# Patient Record
Sex: MALE | Race: WHITE
[De-identification: names, ages, dates, MRNs, and addresses within clinical notes are randomized per-mention and may not be internally consistent; named-entity substitution may affect disease eponyms.]

---

## 2017-11-22 ENCOUNTER — HOSPITAL ENCOUNTER (INPATIENT)
Dept: HOSPITAL 17 - N03A | Age: 77
LOS: 5 days | Discharge: HOME | DRG: 854 | End: 2017-11-27
Attending: HOSPITALIST | Admitting: HOSPITALIST
Payer: MEDICARE

## 2017-11-22 VITALS
HEART RATE: 61 BPM | OXYGEN SATURATION: 95 % | RESPIRATION RATE: 22 BRPM | DIASTOLIC BLOOD PRESSURE: 72 MMHG | SYSTOLIC BLOOD PRESSURE: 161 MMHG

## 2017-11-22 VITALS — BODY MASS INDEX: 26.55 KG/M2 | WEIGHT: 179.24 LBS | HEIGHT: 69 IN

## 2017-11-22 VITALS
TEMPERATURE: 98.2 F | DIASTOLIC BLOOD PRESSURE: 76 MMHG | OXYGEN SATURATION: 94 % | HEART RATE: 62 BPM | RESPIRATION RATE: 22 BRPM | SYSTOLIC BLOOD PRESSURE: 173 MMHG

## 2017-11-22 VITALS
TEMPERATURE: 98 F | DIASTOLIC BLOOD PRESSURE: 74 MMHG | HEART RATE: 62 BPM | SYSTOLIC BLOOD PRESSURE: 188 MMHG | OXYGEN SATURATION: 94 % | RESPIRATION RATE: 25 BRPM

## 2017-11-22 VITALS — HEART RATE: 61 BPM

## 2017-11-22 VITALS — HEART RATE: 60 BPM

## 2017-11-22 VITALS — HEART RATE: 66 BPM

## 2017-11-22 DIAGNOSIS — Z92.3: ICD-10-CM

## 2017-11-22 DIAGNOSIS — E83.51: ICD-10-CM

## 2017-11-22 DIAGNOSIS — E78.5: ICD-10-CM

## 2017-11-22 DIAGNOSIS — J44.9: ICD-10-CM

## 2017-11-22 DIAGNOSIS — Z92.21: ICD-10-CM

## 2017-11-22 DIAGNOSIS — C61: ICD-10-CM

## 2017-11-22 DIAGNOSIS — E11.9: ICD-10-CM

## 2017-11-22 DIAGNOSIS — F17.210: ICD-10-CM

## 2017-11-22 DIAGNOSIS — E83.39: ICD-10-CM

## 2017-11-22 DIAGNOSIS — E87.6: ICD-10-CM

## 2017-11-22 DIAGNOSIS — E83.42: ICD-10-CM

## 2017-11-22 DIAGNOSIS — R63.0: ICD-10-CM

## 2017-11-22 DIAGNOSIS — Z79.01: ICD-10-CM

## 2017-11-22 DIAGNOSIS — I10: ICD-10-CM

## 2017-11-22 DIAGNOSIS — K55.1: ICD-10-CM

## 2017-11-22 DIAGNOSIS — A41.9: Primary | ICD-10-CM

## 2017-11-22 DIAGNOSIS — K81.0: ICD-10-CM

## 2017-11-22 DIAGNOSIS — Z23: ICD-10-CM

## 2017-11-22 DIAGNOSIS — K59.00: ICD-10-CM

## 2017-11-22 DIAGNOSIS — C79.9: ICD-10-CM

## 2017-11-22 LAB
ALP SERPL-CCNC: 78 U/L (ref 45–117)
ALT SERPL-CCNC: 20 U/L (ref 12–78)
AMYLASE SERPL-CCNC: 16 U/L (ref 25–115)
ANION GAP SERPL CALC-SCNC: 7 MEQ/L (ref 5–15)
APTT BLD: 29 SEC (ref 24.3–30.1)
AST SERPL-CCNC: 18 U/L (ref 15–37)
BASOPHILS # BLD AUTO: 0.1 TH/MM3 (ref 0–0.2)
BASOPHILS NFR BLD: 0.4 % (ref 0–2)
BILIRUB INDIRECT SERPL-MCNC: 0.1 MG/DL (ref 0–0.8)
BILIRUB SERPL-MCNC: 0.3 MG/DL (ref 0.2–1)
BUN SERPL-MCNC: 18 MG/DL (ref 7–18)
CHLORIDE SERPL-SCNC: 102 MEQ/L (ref 98–107)
EOSINOPHIL # BLD: 0 TH/MM3 (ref 0–0.4)
EOSINOPHIL NFR BLD: 0 % (ref 0–4)
ERYTHROCYTE [DISTWIDTH] IN BLOOD BY AUTOMATED COUNT: 13.9 % (ref 11.6–17.2)
GFR SERPLBLD BASED ON 1.73 SQ M-ARVRAT: 55 ML/MIN (ref 89–?)
HCO3 BLD-SCNC: 26.2 MEQ/L (ref 21–32)
HCT VFR BLD CALC: 32.8 % (ref 39–51)
HEMO FLAGS: (no result)
INR PPP: 1 RATIO
LYMPHOCYTES # BLD AUTO: 2.1 TH/MM3 (ref 1–4.8)
LYMPHOCYTES NFR BLD AUTO: 7.7 % (ref 9–44)
MAGNESIUM SERPL-MCNC: 1.9 MG/DL (ref 1.5–2.5)
MCH RBC QN AUTO: 29.6 PG (ref 27–34)
MCHC RBC AUTO-ENTMCNC: 32.9 % (ref 32–36)
MCV RBC AUTO: 90 FL (ref 80–100)
MONOCYTES NFR BLD: 7.3 % (ref 0–8)
NEUTROPHILS # BLD AUTO: 23 TH/MM3 (ref 1.8–7.7)
NEUTROPHILS NFR BLD AUTO: 84.6 % (ref 16–70)
PLATELET # BLD: 297 TH/MM3 (ref 150–450)
POTASSIUM SERPL-SCNC: 2.7 MEQ/L (ref 3.5–5.1)
PROTHROMBIN TIME: 10.8 SEC (ref 9.8–11.6)
RBC # BLD AUTO: 3.65 MIL/MM3 (ref 4.5–5.9)
SODIUM SERPL-SCNC: 135 MEQ/L (ref 136–145)
WBC # BLD AUTO: 27.2 TH/MM3 (ref 4–11)

## 2017-11-22 PROCEDURE — 80076 HEPATIC FUNCTION PANEL: CPT

## 2017-11-22 PROCEDURE — 88304 TISSUE EXAM BY PATHOLOGIST: CPT

## 2017-11-22 PROCEDURE — 84155 ASSAY OF PROTEIN SERUM: CPT

## 2017-11-22 PROCEDURE — 82533 TOTAL CORTISOL: CPT

## 2017-11-22 PROCEDURE — 83735 ASSAY OF MAGNESIUM: CPT

## 2017-11-22 PROCEDURE — 80048 BASIC METABOLIC PNL TOTAL CA: CPT

## 2017-11-22 PROCEDURE — 71010: CPT

## 2017-11-22 PROCEDURE — 85027 COMPLETE CBC AUTOMATED: CPT

## 2017-11-22 PROCEDURE — 86901 BLOOD TYPING SEROLOGIC RH(D): CPT

## 2017-11-22 PROCEDURE — 82948 REAGENT STRIP/BLOOD GLUCOSE: CPT

## 2017-11-22 PROCEDURE — 87641 MR-STAPH DNA AMP PROBE: CPT

## 2017-11-22 PROCEDURE — 84100 ASSAY OF PHOSPHORUS: CPT

## 2017-11-22 PROCEDURE — 86900 BLOOD TYPING SEROLOGIC ABO: CPT

## 2017-11-22 PROCEDURE — 93005 ELECTROCARDIOGRAM TRACING: CPT

## 2017-11-22 PROCEDURE — 85025 COMPLETE CBC W/AUTO DIFF WBC: CPT

## 2017-11-22 PROCEDURE — 85610 PROTHROMBIN TIME: CPT

## 2017-11-22 PROCEDURE — 83605 ASSAY OF LACTIC ACID: CPT

## 2017-11-22 PROCEDURE — 76705 ECHO EXAM OF ABDOMEN: CPT

## 2017-11-22 PROCEDURE — 86850 RBC ANTIBODY SCREEN: CPT

## 2017-11-22 PROCEDURE — 82150 ASSAY OF AMYLASE: CPT

## 2017-11-22 PROCEDURE — 87338 HPYLORI STOOL AG IA: CPT

## 2017-11-22 PROCEDURE — 84132 ASSAY OF SERUM POTASSIUM: CPT

## 2017-11-22 PROCEDURE — 85730 THROMBOPLASTIN TIME PARTIAL: CPT

## 2017-11-22 PROCEDURE — 87040 BLOOD CULTURE FOR BACTERIA: CPT

## 2017-11-22 PROCEDURE — 90686 IIV4 VACC NO PRSV 0.5 ML IM: CPT

## 2017-11-22 PROCEDURE — 80053 COMPREHEN METABOLIC PANEL: CPT

## 2017-11-22 PROCEDURE — 76937 US GUIDE VASCULAR ACCESS: CPT

## 2017-11-22 RX ADMIN — MORPHINE SULFATE PRN MG: 2 INJECTION, SOLUTION INTRAMUSCULAR; INTRAVENOUS at 20:25

## 2017-11-22 RX ADMIN — MORPHINE SULFATE PRN MG: 2 INJECTION, SOLUTION INTRAMUSCULAR; INTRAVENOUS at 17:19

## 2017-11-22 RX ADMIN — INSULIN ASPART SCH: 100 INJECTION, SOLUTION INTRAVENOUS; SUBCUTANEOUS at 22:00

## 2017-11-22 RX ADMIN — METRONIDAZOLE SCH MLS/HR: 500 INJECTION, SOLUTION INTRAVENOUS at 20:06

## 2017-11-22 RX ADMIN — CEFEPIME SCH MLS/HR: 2 INJECTION, POWDER, FOR SOLUTION INTRAVENOUS at 20:05

## 2017-11-22 RX ADMIN — POTASSIUM CHLORIDE PRN MLS/HR: 200 INJECTION, SOLUTION INTRAVENOUS at 21:03

## 2017-11-22 RX ADMIN — STANDARDIZED SENNA CONCENTRATE AND DOCUSATE SODIUM SCH TAB: 8.6; 5 TABLET, FILM COATED ORAL at 20:06

## 2017-11-22 RX ADMIN — ATORVASTATIN CALCIUM SCH MG: 40 TABLET, FILM COATED ORAL at 20:06

## 2017-11-22 RX ADMIN — PHENYTOIN SODIUM SCH MLS/HR: 50 INJECTION INTRAMUSCULAR; INTRAVENOUS at 18:26

## 2017-11-22 RX ADMIN — METRONIDAZOLE SCH MLS/HR: 500 INJECTION, SOLUTION INTRAVENOUS at 20:40

## 2017-11-22 RX ADMIN — METOPROLOL TARTRATE SCH MG: 25 TABLET, FILM COATED ORAL at 20:04

## 2017-11-22 RX ADMIN — INSULIN ASPART SCH: 100 INJECTION, SOLUTION INTRAVENOUS; SUBCUTANEOUS at 18:51

## 2017-11-22 NOTE — MB
cc:

DALTON GOVEA

****

 

 

DATE OF CONSULTATION:  11/22/2017

 

REASON FOR CONSULTATION:

Evaluate for possible cholecystitis.

 

HISTORY OF PRESENT ILLNESS:

Mr. Guillen is a pleasant 77 year-old gentleman who was transferred over from

[facility], under the care of Dr. Gregor Freeman for evaluation of

abdominal pain.  He states that over the last two weeks he has had continuous

abdominal pain.  He describes the pain as initially a burning-type pain that

was in the upper abdomen, but now he states the pain is all over.  Apparently

he has been hospitalized at [facility], for several days where he

has had a fairly extensive workup which was fairly unremarkable.  Specifically

he had two CTs of the abdomen and pelvis as well as an ultrasound and HIDA

scan.  There was a question of possible SMA stenosis and mesenteric ischemia

was considered.  He is also noted to have a thickened gallbladder wall on his

ultrasound and some portal edema on the CT, however, apparently he had a HIDA

scan which was fairly unremarkable according to the family.  Unfortunately none

of the reports are available, we only have the films.

 

In reviewing the films, I do see the periportal edema that they talked about on

the CT scan but on the HIDA scan, I see visualization of the gallbladder and

apparent emptying.

 

The patient states the pain is better than when he initially came in.  He did

have some nausea and vomiting on Sunday but none since.  He denies any fever or

chills.  He reports overall malaise and not feeling well, and loss of appetite.

 

 

He was brought over to [facility] for further workup and

evaluation.  The patient states the pain began shortly after he had a shot for

his prostate cancer on 11/6.  He states he has had constipation and only two

bowel movements in the last week.

 

He denies any jaundice.  He denies any fatty food intolerance.  He denies any

previous episodes of abdominal pain.

 

PAST MEDICAL HISTORY:

1. Hypertension.

2. Metastatic prostate cancer.

 

PAST SURGICAL HISTORY:

No abdominal surgeries according to him.

 

FAMILY HISTORY:

Noncontributory.

 

SOCIAL HISTORY

He admits alcohol and cigarette use.  [place] [facility].

 

 

REVIEW OF SYSTEMS:

Please see HPI.

 

PHYSICAL EXAMINATION

VITAL SIGNS: Temperature is 98, pulse is 60, blood pressure 140/70, respiratory

rate 20, O2 saturation 100% on 2 liters nasal cannula.

GENERAL: An older gentleman sitting in his bed, who does not appear to be in

significant distress.  He is accompanied by his family.

HEENT:  Pupils equal, round reactive to light.  Sclerae are white.  Oropharynx

is clear and moist.

Neck is supple.  No masses.

Lungs:  Clear to auscultation bilaterally.

Heart: S1-S2 no murmur.

Abdomen:  Soft, he does have some right upper quadrant tenderness on deep

palpation.  No palpable liver or gallbladder that I can appreciate.  He has

active bowel sounds.  He has no rebound or guarding.  No obvious peritonitis.

 

Extremities:  Free range of motion x4.

Neurologically:  He is alert and oriented x3.

 

LABORATORY DATA

White blood cell count 27, hemoglobin 10, platelet count is 297.  Electrolytes

remarkable for hypokalemia at 2.7.  His LFTs were all within normal limits

including his alkaline phosphatase.  Amylase is normal as well.

 

IMAGING STUDIES

Pending at this time.

 

IMPRESSION

Abdominal pain of undetermined etiology.

 

 

 

PLAN

At this point I reviewed all his imaging with Dr. Gigi Kumar review of his

imaging with Dr. Gigi Hernandez in radiology.

 

The only thing Dr. Hernandez noted was the periportal edema on the second CT

scan.

 

His gallbladder did appear slightly thickened on the initial ultrasound.

Certainly the patient's elevated white count is concerning but his LFTs are all

completely normal.

 

On his physical examination he does localize some right upper quadrant pain.

At this point I am unsure of the exact etiology of his pain.  I have ordered

repeat ultrasound to see if he has any pericholecystic fluid or any gallbladder

wall thickening.  Also will order lactate to see if he has an elevate lactate

consistent with ischemic bowel, although his physical examination and history

is not really too consistent with that.  This has been going on for several

weeks.

 

Based on his further imaging and workup, will consider operative intervention.

Although I am unsure of the exact etiology of the pain, we could consider

diagnostic laparoscopy, although I am not sure that this would be too helpful

at this point with no specific diagnosis.

 

All this was discussed with the family at the bedside.  Will follow up on his

additional imaging and lab work.

 

Thank you very much for allowing me to participate in his care.

 

 

 

                              _________________________________

                              MD ANN Mantilla/JACOB

D:  11/22/2017/5:14 PM

T:  11/22/2017/10:06 PM

Visit #:  [id number]

Job #:  [id number]
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
## 2017-11-22 NOTE — RADRPT
EXAM DATE/TIME:  11/22/2017 17:24 

 

[facility] COMPARISON:     

No previous studies available for comparison.

        

 

 

INDICATIONS :     

Abdomen pain. 

                     

 

MEDICAL HISTORY :     

Hypertension. Carcinoma, prostate.   Diabetes. 

 

SURGICAL HISTORY :          

Cataracts. 

 

ENCOUNTER:     

Initial

 

ACUITY:     

1 week

 

PAIN SCORE:     

4/10

 

LOCATION:     

Right upper quadrant 

                     

MEASUREMENTS:     

 

LIVER:     

18.8 cm length 

 

COMMON DUCT:     

9 mm

 

RIGHT KIDNEY:     

10.5 x 4.9 x 5.4 cm

 

FINDINGS:     

 

LIVER:     

Diffuse and fairly homogeneous coarse echotexture without focal lesion or significant ductal dilatati
on.  Hepatopedal flow is documented in the portal vein.  

 

COMMON DUCT:     

No intraluminal mass or stone visualized.  

 

GALLBLADDER:          

No shadowing stones seen.  Gallbladder wall is prominent measuring up to 7 mm.  No pericholecystic fl
uid.

 

PANCREAS:          

Not well seen

 

RIGHT KIDNEY:          

No evidence of hydronephrosis, stone, or mass.  

 

CONCLUSION:     

1. Hepatomegaly without focal lesion.

2. Gallbladder wall thickening and dilation of the common bile duct without demonstrable gallstones. 
 May consider performing hepatobiliary tract scan to evaluate for acalculous cholecystitis.

 

 

 

 Edmund Cardona MD on November 22, 2017 at 19:12           

Board Certified Radiologist.

 This report was verified electronically.
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
## 2017-11-22 NOTE — RADRPT
EXAM DATE/TIME:  11/22/2017 14:01 

 

[facility] COMPARISON:     

No previous studies available for comparison.

 

                     

INDICATIONS :     

Short of breath.

                     

 

MEDICAL HISTORY :     

None.          

 

SURGICAL HISTORY :     

None.   

 

ENCOUNTER:     

Initial                                        

 

ACUITY:     

1 day      

 

PAIN SCORE:     

Non-responsive.

 

LOCATION:     

Bilateral chest 

 

FINDINGS:     

A single view of the chest demonstrates the lungs to be symmetrically aerated without evidence of mas
s, infiltrate or effusion.  The cardiomediastinal contours are unremarkable.  Osseous structures are 
intact. Large calcified right paratracheal lymph node

 

CONCLUSION:     Normal examination.  

 

 

 

 Donell Cornelius MD on November 22, 2017 at 16:34           

Board Certified Radiologist.

 This report was verified electronically.

## 2017-11-23 VITALS
OXYGEN SATURATION: 95 % | TEMPERATURE: 98.7 F | DIASTOLIC BLOOD PRESSURE: 76 MMHG | HEART RATE: 70 BPM | SYSTOLIC BLOOD PRESSURE: 157 MMHG | RESPIRATION RATE: 25 BRPM

## 2017-11-23 VITALS
OXYGEN SATURATION: 98 % | RESPIRATION RATE: 26 BRPM | TEMPERATURE: 98.9 F | HEART RATE: 62 BPM | DIASTOLIC BLOOD PRESSURE: 74 MMHG | SYSTOLIC BLOOD PRESSURE: 165 MMHG

## 2017-11-23 VITALS
OXYGEN SATURATION: 93 % | RESPIRATION RATE: 26 BRPM | SYSTOLIC BLOOD PRESSURE: 165 MMHG | HEART RATE: 64 BPM | TEMPERATURE: 99.4 F | DIASTOLIC BLOOD PRESSURE: 74 MMHG

## 2017-11-23 VITALS
TEMPERATURE: 99 F | DIASTOLIC BLOOD PRESSURE: 69 MMHG | RESPIRATION RATE: 25 BRPM | SYSTOLIC BLOOD PRESSURE: 155 MMHG | OXYGEN SATURATION: 96 % | HEART RATE: 74 BPM

## 2017-11-23 VITALS
RESPIRATION RATE: 20 BRPM | TEMPERATURE: 98.8 F | HEART RATE: 66 BPM | SYSTOLIC BLOOD PRESSURE: 188 MMHG | DIASTOLIC BLOOD PRESSURE: 81 MMHG | OXYGEN SATURATION: 95 %

## 2017-11-23 VITALS
OXYGEN SATURATION: 91 % | TEMPERATURE: 99 F | SYSTOLIC BLOOD PRESSURE: 157 MMHG | RESPIRATION RATE: 24 BRPM | DIASTOLIC BLOOD PRESSURE: 70 MMHG | HEART RATE: 64 BPM

## 2017-11-23 VITALS — HEART RATE: 75 BPM

## 2017-11-23 VITALS — HEART RATE: 70 BPM

## 2017-11-23 VITALS — HEART RATE: 64 BPM

## 2017-11-23 VITALS — HEART RATE: 74 BPM

## 2017-11-23 LAB
ALP SERPL-CCNC: 79 U/L (ref 45–117)
ALT SERPL-CCNC: 20 U/L (ref 12–78)
ANION GAP SERPL CALC-SCNC: 10 MEQ/L (ref 5–15)
AST SERPL-CCNC: 18 U/L (ref 15–37)
BASOPHILS # BLD AUTO: 0 TH/MM3 (ref 0–0.2)
BASOPHILS NFR BLD: 0.2 % (ref 0–2)
BILIRUB SERPL-MCNC: 0.3 MG/DL (ref 0.2–1)
BUN SERPL-MCNC: 16 MG/DL (ref 7–18)
CHLORIDE SERPL-SCNC: 106 MEQ/L (ref 98–107)
EOSINOPHIL # BLD: 0 TH/MM3 (ref 0–0.4)
EOSINOPHIL NFR BLD: 0.1 % (ref 0–4)
ERYTHROCYTE [DISTWIDTH] IN BLOOD BY AUTOMATED COUNT: 14.1 % (ref 11.6–17.2)
ERYTHROCYTE [DISTWIDTH] IN BLOOD BY AUTOMATED COUNT: 14.4 % (ref 11.6–17.2)
GFR SERPLBLD BASED ON 1.73 SQ M-ARVRAT: 71 ML/MIN (ref 89–?)
HCO3 BLD-SCNC: 24 MEQ/L (ref 21–32)
HCT VFR BLD CALC: 33.2 % (ref 39–51)
HCT VFR BLD CALC: 34.8 % (ref 39–51)
HEMO FLAGS: (no result)
LYMPHOCYTES # BLD AUTO: 2 TH/MM3 (ref 1–4.8)
LYMPHOCYTES NFR BLD AUTO: 8.1 % (ref 9–44)
MAGNESIUM SERPL-MCNC: 2 MG/DL (ref 1.5–2.5)
MCH RBC QN AUTO: 30.3 PG (ref 27–34)
MCH RBC QN AUTO: 30.4 PG (ref 27–34)
MCHC RBC AUTO-ENTMCNC: 33.7 % (ref 32–36)
MCHC RBC AUTO-ENTMCNC: 33.7 % (ref 32–36)
MCV RBC AUTO: 89.9 FL (ref 80–100)
MCV RBC AUTO: 90.3 FL (ref 80–100)
MONOCYTES NFR BLD: 7 % (ref 0–8)
NEUTROPHILS # BLD AUTO: 20.8 TH/MM3 (ref 1.8–7.7)
NEUTROPHILS NFR BLD AUTO: 84.6 % (ref 16–70)
PLATELET # BLD: 315 TH/MM3 (ref 150–450)
PLATELET # BLD: 323 TH/MM3 (ref 150–450)
POTASSIUM SERPL-SCNC: 2.9 MEQ/L (ref 3.5–5.1)
RBC # BLD AUTO: 3.68 MIL/MM3 (ref 4.5–5.9)
RBC # BLD AUTO: 3.87 MIL/MM3 (ref 4.5–5.9)
REVIEW FLAG: (no result)
SODIUM SERPL-SCNC: 140 MEQ/L (ref 136–145)
WBC # BLD AUTO: 23.7 TH/MM3 (ref 4–11)
WBC # BLD AUTO: 24.6 TH/MM3 (ref 4–11)

## 2017-11-23 RX ADMIN — METOPROLOL TARTRATE SCH MG: 25 TABLET, FILM COATED ORAL at 10:08

## 2017-11-23 RX ADMIN — POTASSIUM CHLORIDE PRN MLS/HR: 200 INJECTION, SOLUTION INTRAVENOUS at 04:53

## 2017-11-23 RX ADMIN — INSULIN ASPART SCH: 100 INJECTION, SOLUTION INTRAVENOUS; SUBCUTANEOUS at 06:00

## 2017-11-23 RX ADMIN — POTASSIUM CHLORIDE PRN MLS/HR: 200 INJECTION, SOLUTION INTRAVENOUS at 02:08

## 2017-11-23 RX ADMIN — ATORVASTATIN CALCIUM SCH MG: 40 TABLET, FILM COATED ORAL at 20:22

## 2017-11-23 RX ADMIN — POTASSIUM CHLORIDE PRN MLS/HR: 200 INJECTION, SOLUTION INTRAVENOUS at 23:10

## 2017-11-23 RX ADMIN — INSULIN ASPART SCH: 100 INJECTION, SOLUTION INTRAVENOUS; SUBCUTANEOUS at 14:00

## 2017-11-23 RX ADMIN — PHENYTOIN SODIUM SCH MLS/HR: 50 INJECTION INTRAMUSCULAR; INTRAVENOUS at 10:12

## 2017-11-23 RX ADMIN — ENOXAPARIN SODIUM SCH MG: 30 INJECTION SUBCUTANEOUS at 15:37

## 2017-11-23 RX ADMIN — POTASSIUM BICARBONATE AND POTASSIUM CHLORIDE EFFERVESCENT TABLETS FOR ORAL SOLUTION SCH MEQ: 1.25; .7; 1.5 TABLET, EFFERVESCENT ORAL at 15:37

## 2017-11-23 RX ADMIN — PHENYTOIN SODIUM SCH MLS/HR: 50 INJECTION INTRAMUSCULAR; INTRAVENOUS at 21:17

## 2017-11-23 RX ADMIN — POTASSIUM BICARBONATE AND POTASSIUM CHLORIDE EFFERVESCENT TABLETS FOR ORAL SOLUTION SCH MEQ: 1.25; .7; 1.5 TABLET, EFFERVESCENT ORAL at 20:22

## 2017-11-23 RX ADMIN — ASPIRIN SCH MG: 325 TABLET ORAL at 10:07

## 2017-11-23 RX ADMIN — INSULIN ASPART SCH: 100 INJECTION, SOLUTION INTRAVENOUS; SUBCUTANEOUS at 18:15

## 2017-11-23 RX ADMIN — CEFEPIME SCH MLS/HR: 2 INJECTION, POWDER, FOR SOLUTION INTRAVENOUS at 10:07

## 2017-11-23 RX ADMIN — INSULIN ASPART SCH: 100 INJECTION, SOLUTION INTRAVENOUS; SUBCUTANEOUS at 01:26

## 2017-11-23 RX ADMIN — CEFEPIME SCH MLS/HR: 2 INJECTION, POWDER, FOR SOLUTION INTRAVENOUS at 20:23

## 2017-11-23 RX ADMIN — METRONIDAZOLE SCH MLS/HR: 500 INJECTION, SOLUTION INTRAVENOUS at 21:33

## 2017-11-23 RX ADMIN — INSULIN ASPART SCH: 100 INJECTION, SOLUTION INTRAVENOUS; SUBCUTANEOUS at 11:54

## 2017-11-23 RX ADMIN — PHENYTOIN SODIUM SCH MLS/HR: 50 INJECTION INTRAMUSCULAR; INTRAVENOUS at 02:00

## 2017-11-23 RX ADMIN — METRONIDAZOLE SCH MLS/HR: 500 INJECTION, SOLUTION INTRAVENOUS at 03:46

## 2017-11-23 RX ADMIN — PHENYTOIN SODIUM SCH MLS/HR: 50 INJECTION INTRAMUSCULAR; INTRAVENOUS at 04:58

## 2017-11-23 RX ADMIN — STANDARDIZED SENNA CONCENTRATE AND DOCUSATE SODIUM SCH TAB: 8.6; 5 TABLET, FILM COATED ORAL at 20:22

## 2017-11-23 RX ADMIN — INSULIN ASPART SCH: 100 INJECTION, SOLUTION INTRAVENOUS; SUBCUTANEOUS at 21:45

## 2017-11-23 RX ADMIN — STANDARDIZED SENNA CONCENTRATE AND DOCUSATE SODIUM SCH TAB: 8.6; 5 TABLET, FILM COATED ORAL at 10:07

## 2017-11-23 RX ADMIN — PHENYTOIN SODIUM SCH MLS/HR: 50 INJECTION INTRAMUSCULAR; INTRAVENOUS at 17:52

## 2017-11-23 RX ADMIN — METRONIDAZOLE SCH MLS/HR: 500 INJECTION, SOLUTION INTRAVENOUS at 12:00

## 2017-11-23 RX ADMIN — POTASSIUM CHLORIDE PRN MLS/HR: 200 INJECTION, SOLUTION INTRAVENOUS at 20:24

## 2017-11-23 RX ADMIN — METOPROLOL TARTRATE SCH MG: 25 TABLET, FILM COATED ORAL at 20:22

## 2017-11-23 RX ADMIN — PANTOPRAZOLE SCH MG: 40 TABLET, DELAYED RELEASE ORAL at 10:07

## 2017-11-23 NOTE — EKG
Date Performed: 11/22/2017       Time Performed: 16:54:57

 

PTAGE:      77 years

 

EKG:      SINUS BRADYCARDIA WITH FIRST DEGREE AV BLOCK NONSPECIFIC ST & T-WAVE ABNORMALITY ABNORMAL E
CG 

 

NO PREVIOUS TRACING            

 

DOCTOR:   Raffi Bardales  Interpretating Date/Time  11/23/2017 10:38:03

## 2017-11-23 NOTE — HHI.CCPN
Objective





Vital Signs








  Date Time  Temp Pulse Resp B/P (MAP) Pulse Ox O2 Delivery O2 Flow Rate FiO2


 


11/23/17 12:00  62      


 


11/23/17 12:00 98.9  26 165/74 (104) 98   


 


11/23/17 07:00      Room Air  














Intake and Output   


 


 11/23/17 11/23/17 11/24/17





 08:00 16:00 00:00


 


Intake Total 363 ml  


 


Output Total 825 ml  


 


Balance -462 ml  








Result Diagram:  


11/23/17 0924                                                                  

              11/23/17 0513














Evangelist Delgado MD Nov 23, 2017 14:23

## 2017-11-23 NOTE — HHI.CCPN
Subjective


Remarks/Hospital Course


Patient is a 71-year-old  male with past medical history significant 

for hypertension, diabetes, COPD, continue smoking, metastatic prostate cancer 

who was admitted to outside hospital in [place] for with 2 weeks of abdominal 

pain.  Apparently he had WBC count in 30,000s and had gallbladder sludge on US 

but HIDA scan was negative per report.  CT showed mesenteric stenosis and 

patient was transferred to [place] under Dr. Freeman. According to patient his 

abdominal pain started after getting hormonal therapy (anti-testosterone therapy

) for metastatic prostate CA on 11/6.  No diarrhea, but has constipation.  





I evaluated the patient in the ICU.  He appears to be lying comfortably on the 

bed.  On clinical exam has severe tenderness in the right lower quadrant and 

moderate tenderness in the epigastric region.  Abdominal ultrasound had been 

performed but patient report pending at this time.  Patient's white count is 27,

000 and clinical exam is concerning for acute cholecystitis.  Gen. surgery Dr. staton has seen him.  I will check blood culture urine culture, place him on 

cefepime and Flagyl.  Patient may need cholecystostomy/cholecystectomy vs 

mesenteric artery stenting based on clinical course.  Discussed with Dr. Freeman 

and Dr. Staton





11/23: Subjectively feels slightly better.   in the right upper 

quadrant.  WBC count is improving.  Discussed with Dr. Mixon today- he is 

considering diagnostic laparoscopy and possible lap cholecystectomy if indicated





Objective





Vital Signs








  Date Time  Temp Pulse Resp B/P (MAP) Pulse Ox O2 Delivery O2 Flow Rate FiO2


 


11/23/17 12:00  62      


 


11/23/17 12:00 98.9  26 165/74 (104) 98   


 


11/23/17 07:00      Room Air  














Intake and Output   


 


 11/23/17 11/23/17 11/24/17





 08:00 16:00 00:00


 


Intake Total 363 ml  


 


Output Total 825 ml  


 


Balance -462 ml  








Result Diagram:  


11/23/17 0924 11/23/17 0513





Objective Remarks


GENERAL: Alert awake in no distress


SKIN: Warm dry


HEAD: AT/NC


EYES: Pupils equal round and reactive. No scleral icterus. No injection or 

drainage. 


ENT: Nose without bleeding. Airway patent. 


NECK: Trachea midline. No JVD or lymphadenopathy. Supple, nontender,


CARDIOVASCULAR: Regular rate and rhythm without murmurs, gallops, or rubs. 


RESPIRATORY: Clear to auscultation. Breath sounds equal bilaterally. No wheezes

, rales, or rhonchi.  


GASTROINTESTINAL: Abdomen soft, moderate to severe RUQ tenderness and moderate 

tenderness epigastric region


NEUROLOGICAL: AO x3. No focal deficits.





A/P


Assessment and Plan


ASSESSMENT:


Probable sepsis


Probable Mesenteric ischemia


Probable cholecystitis, with sepsis


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


Hypokalemia


COPD


Type 2 diabetes


Dyslipidemia


Metastatic prostate cancer





PLAN:


NEURO: 


- Morphine when necessary for pain.  Otherwise minimize sedation





RESP: 


- Nasal cannula oxygen.  DuoNeb every 6 hours when necessary


- Strongly advised to quit smoking





CV: 


- Normal saline IV fluids maintenance 125 ml per hour


- Lactic acid normal





GI/ID: 


- Clear liquid diet.  IV famotidine


- GB US -shows gallbladder wall thickening and dilatation.  Consider HIDA


- Broad-spectrum antibiotics with cefepime and Flagyl


- Gen surgery considering diagnostic laparoscopy and possible lap 

cholecystectomy


- F/u culture


- Gen. surgery consulted, vascular surgery Dr. Freeman


- If abdominal pain and white count not improving may need mesenteric artery 

stenting


- Consider GI consult and EGD/colonoscopy if not improving


- Stool for Hemoccult, stool H pylori





: 


- Monitor renal function closely. Accurate I/O





HEME: 


- Leukocytosis most likely secondary to sepsis, however mesenteric ischemia 

cannot be ruled out completely at this time





ENDO: 


- Electrolyte replacement per protocol


- Sliding scale insulin





PROPH: 


- Bilateral lower extremity SCDs. Lovenox, Famotidine





LINES: 


- Utilize peripheral IVs, central line if needed





Level 2











Evangelist Delgado MD Nov 23, 2017 14:44

## 2017-11-23 NOTE — PD.VS.PN
Subjective


Subjective/Hospital Course


Pt notes that he has less abdominal pain this morning; + hungry





Objective


Vitals/I&O











  Date Time  Temp Pulse Resp B/P (MAP) Pulse Ox O2 Delivery O2 Flow Rate FiO2


 


11/23/17 07:00     94 Room Air  


 


11/23/17 06:00  64      


 


11/23/17 04:00  64      


 


11/23/17 04:00 99.0 64 24 157/70 (99) 91   


 


11/23/17 02:00  64      


 


11/23/17 00:00  63      


 


11/23/17 00:00 99.4 64 26 165/74 (104) 93   


 


11/22/17 22:00  66      


 


11/22/17 20:00 98.0 62 25 188/74 (112) 94   


 


11/22/17 20:00  62      


 


11/22/17 19:00     92 Room Air  


 


11/22/17 18:00  60      


 


11/22/17 16:00  61      


 


11/22/17 15:52 98.2 62 22 173/76 (108) 94   


 


11/22/17 15:00  61      


 


11/22/17 14:52  61 22 161/72 (101) 95   


 


11/22/17 14:45  61      


 


11/22/17 14:45     93 Room Air  














 11/23/17 11/23/17 11/23/17





 07:00 15:00 23:00


 


Intake Total 363 ml  


 


Output Total 825 ml  


 


Balance -462 ml  








Physical Exam


+ TTP R UQ


no rebound


palpable femoral pulses


Laboratory





Laboratory Tests








Test


  11/22/17


15:00 11/22/17


17:28 11/22/17


18:00 11/23/17


05:13


 


White Blood Count 27.2    24.6 


 


Red Blood Count 3.65    3.68 


 


Hemoglobin 10.8    11.2 


 


Hematocrit 32.8    33.2 


 


Mean Corpuscular Volume 90.0    90.3 


 


Mean Corpuscular Hemoglobin 29.6    30.4 


 


Mean Corpuscular Hemoglobin


Concent 32.9 


  


  


  33.7 


 


 


Red Cell Distribution Width 13.9    14.1 


 


Platelet Count 297    323 


 


Mean Platelet Volume 8.2    8.1 


 


Neutrophils (%) (Auto) 84.6    84.6 


 


Lymphocytes (%) (Auto) 7.7    8.1 


 


Monocytes (%) (Auto) 7.3    7.0 


 


Eosinophils (%) (Auto) 0.0    0.1 


 


Basophils (%) (Auto) 0.4    0.2 


 


Neutrophils # (Auto) 23.0    20.8 


 


Lymphocytes # (Auto) 2.1    2.0 


 


Monocytes # (Auto) 2.0    1.7 


 


Eosinophils # (Auto) 0.0    0.0 


 


Basophils # (Auto) 0.1    0.0 


 


CBC Comment DIFF FINAL    DIFF FINAL 


 


Differential Comment      


 


Prothrombin Time 10.8    


 


Prothromb Time International


Ratio 1.0 


  


  


  


 


 


Activated Partial


Thromboplast Time 29.0 


  


  


  


 


 


Blood Urea Nitrogen 18    16 


 


Creatinine 1.26    1.01 


 


Random Glucose 166    166 


 


Total Protein 6.7    6.7 


 


Albumin 2.2    2.2 


 


Calcium Level 8.2    8.1 


 


Phosphorus Level 1.5    


 


Magnesium Level 1.9    2.0 


 


Alkaline Phosphatase 78    79 


 


Aspartate Amino Transf


(AST/SGOT) 18 


  


  


  18 


 


 


Alanine Aminotransferase


(ALT/SGPT) 20 


  


  


  20 


 


 


Total Bilirubin 0.3    0.3 


 


Direct Bilirubin 0.2    


 


Sodium Level 135    140 


 


Potassium Level 2.7    2.9 


 


Chloride Level 102    106 


 


Carbon Dioxide Level 26.2    24.0 


 


Anion Gap 7    10 


 


Estimat Glomerular Filtration


Rate 55 


  


  


  71 


 


 


Indirect Bilirubin 0.1    


 


Amylase Level 16    


 


Lactic Acid Level  1.2   


 


Nasal Screen MRSA (PCR)


  


  


  MRSA NOT


DETECTED 


 














 Date/Time


Source Procedure


Growth Status


 


 


 11/23/17 05:19


Blood Peripheral Aerobic Blood Culture


Pending Received


 


 11/23/17 05:19


Blood Peripheral Anaerobic Blood Culture


Pending Received








Imaging





Last 48 hours Impressions








Gall Bladder Ultrasound 11/22/17 0000 Signed





Impressions: 





 Service Date/Time:  Wednesday, November 22, 2017 17:24 - CONCLUSION:  1. 





 Hepatomegaly without focal lesion. 2. Gallbladder wall thickening and dilation 





 of the common bile duct without demonstrable gallstones.  May consider 





 performing hepatobiliary tract scan to evaluate for acalculous cholecystitis. 

   





  Edmund Cardona MD 


 


Chest X-Ray 11/22/17 0000 Signed





Impressions: 





 Service Date/Time:  Wednesday, November 22, 2017 14:01 - CONCLUSION: Normal 





 examination.       Donell Cornelius MD 











Assessment and Plan


Plan


Abdominal pain of unclear etiology - gallbladder vs atypical mesenteric ischemia





1. replete K


2. Bowel regimen as no BM in several days


3. appreciate general surgery input 


4. If pain doesn't improve or gets worse, will perform mesenteric angiography 

and stenting tomorrow (FRI)


5. ok to try po from my standpoint








Gregor Freeman MD FACS VI





Associate Professor of Surgery


[facility] - Heart and Vascular Surgery at [facility]

















Gregor Freeman MD Nov 23, 2017 08:56

## 2017-11-23 NOTE — HHI.PR
__________________________________________________





Subjective


Subjective Notes


no acute issues, still with persistent pain, no fevers wbc 24 k





Objective


Vitals/I&O





Vital Signs








  Date Time  Temp Pulse Resp B/P (MAP) Pulse Ox O2 Delivery O2 Flow Rate FiO2


 


11/23/17 07:00     94 Room Air  


 


11/23/17 06:00  64      


 


11/23/17 04:00 99.0  24 157/70 (99)    








Labs





Laboratory Tests








Test


  11/22/17


15:00 11/22/17


17:28 11/22/17


18:00 11/23/17


05:13


 


White Blood Count 27.2    24.6 


 


Red Blood Count 3.65    3.68 


 


Hemoglobin 10.8    11.2 


 


Hematocrit 32.8    33.2 


 


Mean Corpuscular Volume 90.0    90.3 


 


Mean Corpuscular Hemoglobin 29.6    30.4 


 


Mean Corpuscular Hemoglobin


Concent 32.9 


  


  


  33.7 


 


 


Red Cell Distribution Width 13.9    14.1 


 


Platelet Count 297    323 


 


Mean Platelet Volume 8.2    8.1 


 


Neutrophils (%) (Auto) 84.6    84.6 


 


Lymphocytes (%) (Auto) 7.7    8.1 


 


Monocytes (%) (Auto) 7.3    7.0 


 


Eosinophils (%) (Auto) 0.0    0.1 


 


Basophils (%) (Auto) 0.4    0.2 


 


Neutrophils # (Auto) 23.0    20.8 


 


Lymphocytes # (Auto) 2.1    2.0 


 


Monocytes # (Auto) 2.0    1.7 


 


Eosinophils # (Auto) 0.0    0.0 


 


Basophils # (Auto) 0.1    0.0 


 


CBC Comment DIFF FINAL    DIFF FINAL 


 


Differential Comment      


 


Prothrombin Time 10.8    


 


Prothromb Time International


Ratio 1.0 


  


  


  


 


 


Activated Partial


Thromboplast Time 29.0 


  


  


  


 


 


Blood Urea Nitrogen 18    16 


 


Creatinine 1.26    1.01 


 


Random Glucose 166    166 


 


Total Protein 6.7    6.7 


 


Albumin 2.2    2.2 


 


Calcium Level 8.2    8.1 


 


Phosphorus Level 1.5    


 


Magnesium Level 1.9    2.0 


 


Alkaline Phosphatase 78    79 


 


Aspartate Amino Transf


(AST/SGOT) 18 


  


  


  18 


 


 


Alanine Aminotransferase


(ALT/SGPT) 20 


  


  


  20 


 


 


Total Bilirubin 0.3    0.3 


 


Direct Bilirubin 0.2    


 


Sodium Level 135    140 


 


Potassium Level 2.7    2.9 


 


Chloride Level 102    106 


 


Carbon Dioxide Level 26.2    24.0 


 


Anion Gap 7    10 


 


Estimat Glomerular Filtration


Rate 55 


  


  


  71 


 


 


Indirect Bilirubin 0.1    


 


Amylase Level 16    


 


Lactic Acid Level  1.2   


 


Nasal Screen MRSA (PCR)


  


  


  MRSA NOT


DETECTED 


 


 


Test


  11/23/17


09:24 


  


  


 














 Date/Time


Source Procedure


Growth Status


 


 


 11/23/17 05:19


Blood Peripheral Aerobic Blood Culture


Pending Received


 


 11/23/17 05:19


Blood Peripheral Anaerobic Blood Culture


Pending Received








Abdomen:  Other (mild right sided ttp, no rebound, )





A/P


Assessment and Plan


78 y/o m with abdominal pain uncertain etiology mesenteric ischemia or 

acalculous modesto, leukocytosis


PLAN


Ok for clear diet


abx


pain control


possible dx lap, possible lap modesto will discuss with Epi Fitch MD Nov 23, 2017 09:35

## 2017-11-24 VITALS
RESPIRATION RATE: 18 BRPM | HEART RATE: 87 BPM | OXYGEN SATURATION: 92 % | TEMPERATURE: 97.2 F | DIASTOLIC BLOOD PRESSURE: 70 MMHG | SYSTOLIC BLOOD PRESSURE: 153 MMHG

## 2017-11-24 VITALS — OXYGEN SATURATION: 100 %

## 2017-11-24 VITALS — HEART RATE: 76 BPM

## 2017-11-24 VITALS
OXYGEN SATURATION: 99 % | HEART RATE: 79 BPM | RESPIRATION RATE: 24 BRPM | SYSTOLIC BLOOD PRESSURE: 164 MMHG | DIASTOLIC BLOOD PRESSURE: 73 MMHG | TEMPERATURE: 98.4 F

## 2017-11-24 VITALS
TEMPERATURE: 99.1 F | DIASTOLIC BLOOD PRESSURE: 63 MMHG | OXYGEN SATURATION: 95 % | SYSTOLIC BLOOD PRESSURE: 139 MMHG | RESPIRATION RATE: 27 BRPM | HEART RATE: 72 BPM

## 2017-11-24 VITALS
HEART RATE: 76 BPM | RESPIRATION RATE: 26 BRPM | SYSTOLIC BLOOD PRESSURE: 153 MMHG | DIASTOLIC BLOOD PRESSURE: 67 MMHG | OXYGEN SATURATION: 99 % | TEMPERATURE: 98.6 F

## 2017-11-24 VITALS
TEMPERATURE: 98.6 F | OXYGEN SATURATION: 99 % | RESPIRATION RATE: 26 BRPM | HEART RATE: 78 BPM | SYSTOLIC BLOOD PRESSURE: 154 MMHG | DIASTOLIC BLOOD PRESSURE: 98 MMHG

## 2017-11-24 VITALS — OXYGEN SATURATION: 96 %

## 2017-11-24 VITALS — HEART RATE: 73 BPM

## 2017-11-24 VITALS — HEART RATE: 88 BPM

## 2017-11-24 VITALS — HEART RATE: 85 BPM

## 2017-11-24 VITALS — HEART RATE: 75 BPM

## 2017-11-24 VITALS — HEART RATE: 77 BPM

## 2017-11-24 LAB
ALP SERPL-CCNC: 70 U/L (ref 45–117)
ALT SERPL-CCNC: 16 U/L (ref 12–78)
ANION GAP SERPL CALC-SCNC: 8 MEQ/L (ref 5–15)
AST SERPL-CCNC: 19 U/L (ref 15–37)
BASOPHILS # BLD AUTO: 0 TH/MM3 (ref 0–0.2)
BASOPHILS NFR BLD: 0.3 % (ref 0–2)
BILIRUB SERPL-MCNC: 0.3 MG/DL (ref 0.2–1)
BUN SERPL-MCNC: 15 MG/DL (ref 7–18)
CALCIUM TP COR SERPL-MCNC: 7.5 MG/DL (ref 8.5–10.1)
CHLORIDE SERPL-SCNC: 110 MEQ/L (ref 98–107)
EOSINOPHIL # BLD: 0.1 TH/MM3 (ref 0–0.4)
EOSINOPHIL NFR BLD: 0.4 % (ref 0–4)
ERYTHROCYTE [DISTWIDTH] IN BLOOD BY AUTOMATED COUNT: 14.6 % (ref 11.6–17.2)
GFR SERPLBLD BASED ON 1.73 SQ M-ARVRAT: 72 ML/MIN (ref 89–?)
HCO3 BLD-SCNC: 19.7 MEQ/L (ref 21–32)
HCT VFR BLD CALC: 30.8 % (ref 39–51)
HEMO FLAGS: (no result)
LYMPHOCYTES # BLD AUTO: 1.7 TH/MM3 (ref 1–4.8)
LYMPHOCYTES NFR BLD AUTO: 10.6 % (ref 9–44)
MAGNESIUM SERPL-MCNC: 1.6 MG/DL (ref 1.5–2.5)
MAGNESIUM SERPL-MCNC: 2.5 MG/DL (ref 1.5–2.5)
MCH RBC QN AUTO: 30 PG (ref 27–34)
MCHC RBC AUTO-ENTMCNC: 33.5 % (ref 32–36)
MCV RBC AUTO: 89.6 FL (ref 80–100)
MONOCYTES NFR BLD: 8.4 % (ref 0–8)
NEUTROPHILS # BLD AUTO: 13.2 TH/MM3 (ref 1.8–7.7)
NEUTROPHILS NFR BLD AUTO: 80.3 % (ref 16–70)
PLATELET # BLD: 291 TH/MM3 (ref 150–450)
POTASSIUM SERPL-SCNC: 2.9 MEQ/L (ref 3.5–5.1)
POTASSIUM SERPL-SCNC: 3.8 MEQ/L (ref 3.5–5.1)
RBC # BLD AUTO: 3.44 MIL/MM3 (ref 4.5–5.9)
SODIUM SERPL-SCNC: 138 MEQ/L (ref 136–145)
WBC # BLD AUTO: 16.4 TH/MM3 (ref 4–11)

## 2017-11-24 PROCEDURE — 0FT44ZZ RESECTION OF GALLBLADDER, PERCUTANEOUS ENDOSCOPIC APPROACH: ICD-10-PCS | Performed by: SURGERY

## 2017-11-24 RX ADMIN — ENOXAPARIN SODIUM SCH MG: 30 INJECTION SUBCUTANEOUS at 15:10

## 2017-11-24 RX ADMIN — POTASSIUM BICARBONATE AND POTASSIUM CHLORIDE EFFERVESCENT TABLETS FOR ORAL SOLUTION SCH MEQ: 1.25; .7; 1.5 TABLET, EFFERVESCENT ORAL at 19:46

## 2017-11-24 RX ADMIN — INSULIN ASPART SCH: 100 INJECTION, SOLUTION INTRAVENOUS; SUBCUTANEOUS at 02:00

## 2017-11-24 RX ADMIN — METRONIDAZOLE SCH MLS/HR: 500 INJECTION, SOLUTION INTRAVENOUS at 12:08

## 2017-11-24 RX ADMIN — STANDARDIZED SENNA CONCENTRATE AND DOCUSATE SODIUM SCH TAB: 8.6; 5 TABLET, FILM COATED ORAL at 11:01

## 2017-11-24 RX ADMIN — PANTOPRAZOLE SCH MG: 40 TABLET, DELAYED RELEASE ORAL at 11:01

## 2017-11-24 RX ADMIN — POTASSIUM CHLORIDE PRN MLS/HR: 200 INJECTION, SOLUTION INTRAVENOUS at 05:45

## 2017-11-24 RX ADMIN — POTASSIUM CHLORIDE AND SODIUM CHLORIDE SCH MLS/HR: 900; 300 INJECTION, SOLUTION INTRAVENOUS at 09:30

## 2017-11-24 RX ADMIN — PHENYTOIN SODIUM SCH MLS/HR: 50 INJECTION INTRAMUSCULAR; INTRAVENOUS at 04:36

## 2017-11-24 RX ADMIN — HYDROCODONE BITARTRATE AND ACETAMINOPHEN PRN TAB: 5; 325 TABLET ORAL at 19:44

## 2017-11-24 RX ADMIN — POTASSIUM CHLORIDE PRN MLS/HR: 200 INJECTION, SOLUTION INTRAVENOUS at 02:00

## 2017-11-24 RX ADMIN — INSULIN ASPART SCH: 100 INJECTION, SOLUTION INTRAVENOUS; SUBCUTANEOUS at 15:07

## 2017-11-24 RX ADMIN — ATORVASTATIN CALCIUM SCH MG: 40 TABLET, FILM COATED ORAL at 19:45

## 2017-11-24 RX ADMIN — CEFEPIME SCH MLS/HR: 2 INJECTION, POWDER, FOR SOLUTION INTRAVENOUS at 19:45

## 2017-11-24 RX ADMIN — CEFEPIME SCH MLS/HR: 2 INJECTION, POWDER, FOR SOLUTION INTRAVENOUS at 10:54

## 2017-11-24 RX ADMIN — INSULIN ASPART SCH: 100 INJECTION, SOLUTION INTRAVENOUS; SUBCUTANEOUS at 22:40

## 2017-11-24 RX ADMIN — METOPROLOL TARTRATE SCH MG: 25 TABLET, FILM COATED ORAL at 19:45

## 2017-11-24 RX ADMIN — INSULIN ASPART SCH: 100 INJECTION, SOLUTION INTRAVENOUS; SUBCUTANEOUS at 05:37

## 2017-11-24 RX ADMIN — INSULIN ASPART SCH: 100 INJECTION, SOLUTION INTRAVENOUS; SUBCUTANEOUS at 09:22

## 2017-11-24 RX ADMIN — METRONIDAZOLE SCH MLS/HR: 500 INJECTION, SOLUTION INTRAVENOUS at 04:26

## 2017-11-24 RX ADMIN — STANDARDIZED SENNA CONCENTRATE AND DOCUSATE SODIUM SCH TAB: 8.6; 5 TABLET, FILM COATED ORAL at 19:45

## 2017-11-24 RX ADMIN — HYDROCODONE BITARTRATE AND ACETAMINOPHEN PRN TAB: 5; 325 TABLET ORAL at 23:51

## 2017-11-24 RX ADMIN — POTASSIUM CHLORIDE AND SODIUM CHLORIDE SCH MLS/HR: 900; 300 INJECTION, SOLUTION INTRAVENOUS at 18:01

## 2017-11-24 RX ADMIN — POTASSIUM BICARBONATE AND POTASSIUM CHLORIDE EFFERVESCENT TABLETS FOR ORAL SOLUTION SCH MEQ: 1.25; .7; 1.5 TABLET, EFFERVESCENT ORAL at 11:01

## 2017-11-24 RX ADMIN — METOPROLOL TARTRATE SCH MG: 25 TABLET, FILM COATED ORAL at 11:01

## 2017-11-24 RX ADMIN — ASPIRIN SCH MG: 325 TABLET ORAL at 11:00

## 2017-11-24 RX ADMIN — INSULIN ASPART SCH: 100 INJECTION, SOLUTION INTRAVENOUS; SUBCUTANEOUS at 17:59

## 2017-11-24 RX ADMIN — METRONIDAZOLE SCH MLS/HR: 500 INJECTION, SOLUTION INTRAVENOUS at 19:45

## 2017-11-24 RX ADMIN — MAGNESIUM OXIDE TAB 400 MG (241.3 MG ELEMENTAL MG) SCH MG: 400 (241.3 MG) TAB at 11:00

## 2017-11-24 RX ADMIN — MAGNESIUM OXIDE TAB 400 MG (241.3 MG ELEMENTAL MG) SCH MG: 400 (241.3 MG) TAB at 19:44

## 2017-11-24 RX ADMIN — ENALAPRILAT PRN MG: 1.25 INJECTION, SOLUTION INTRAVENOUS at 17:58

## 2017-11-24 NOTE — HHI.CCPN
Subjective


Remarks/Hospital Course


Patient is a 71-year-old  male with past medical history significant 

for hypertension, diabetes, COPD, continue smoking, metastatic prostate cancer 

who was admitted to outside hospital in [place] for with 2 weeks of abdominal 

pain.  Apparently he had WBC count in 30,000s and had gallbladder sludge on US 

but HIDA scan was negative per report.  CT showed mesenteric stenosis and 

patient was transferred to [place] under Dr. Freeman. According to patient his 

abdominal pain started after getting hormonal therapy (anti-testosterone therapy

) for metastatic prostate CA on 11/6.  No diarrhea, but has constipation.  





I evaluated the patient in the ICU.  He appears to be lying comfortably on the 

bed.  On clinical exam has severe tenderness in the right lower quadrant and 

moderate tenderness in the epigastric region.  Abdominal ultrasound had been 

performed but patient report pending at this time.  Patient's white count is 27,

000 and clinical exam is concerning for acute cholecystitis.  Gen. surgery Dr. staton has seen him.  I will check blood culture urine culture, place him on 

cefepime and Flagyl.  Patient may need cholecystostomy/cholecystectomy vs 

mesenteric artery stenting based on clinical course.  Discussed with Dr. Freeman 

and Dr. Staton





11/23: Subjectively feels slightly better.   in the right upper 

quadrant.  WBC count is improving.  Discussed with Dr. Mixon today- he is 

considering diagnostic laparoscopy and possible lap cholecystectomy if indicated





11/24: To OR for diagnostic lap today.  Right upper quadrant tenderness is 

improved but still present.  WBC count down to 16.4, with antibiotics cefepime 

and Flagyl on board.  Clinical presentation is still consistent with 

cholecystitis





Objective





Vital Signs








  Date Time  Temp Pulse Resp B/P (MAP) Pulse Ox O2 Delivery O2 Flow Rate FiO2


 


11/24/17 07:00     97 Room Air  


 


11/24/17 06:00  76      


 


11/24/17 04:00 98.6  26 153/67 (95)    














Intake and Output   


 


 11/24/17 11/24/17 11/25/17





 08:00 16:00 00:00


 


Intake Total 1777 ml  


 


Output Total 700 ml  


 


Balance 1077 ml  








Result Diagram:  


11/24/17 0427 11/24/17 0427





Objective Remarks


GENERAL: Alert awake in no distress


SKIN: Warm dry


HEAD: AT/NC


EYES: Pupils equal round and reactive. No scleral icterus. No injection or 

drainage. 


ENT: Nose without bleeding. Airway patent. 


NECK: Trachea midline. No JVD or lymphadenopathy. Supple, nontender,


CARDIOVASCULAR: Regular rate and rhythm without murmurs, gallops, or rubs. 


RESPIRATORY: Clear to auscultation. Breath sounds equal bilaterally. No wheezes

, rales, or rhonchi.  


GASTROINTESTINAL: Abdomen soft, moderate RUQ tenderness and mild tenderness 

epigastric region


NEUROLOGICAL: AO x3. No focal deficits.





A/P


Assessment and Plan


ASSESSMENT:


Probable cholecystitis, with sepsis


Possible Mesenteric ischemia/mesenteric angina


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


Hypokalemia, hypomagnesemia


COPD


Type 2 diabetes


Dyslipidemia


Metastatic prostate cancer





PLAN:


NEURO: 


- Morphine when necessary for pain.  Otherwise minimize sedation





RESP: 


- Nasal cannula oxygen.  DuoNeb every 6 hours when necessary


- Strongly advised to quit smoking





CV: 


- Normal saline IV fluids maintenance 125 ml per hour


- Lactic acid normal





GI/ID: 


- Clear liquid diet., was NPO after midnight for OR.  IV famotidine


- GB US -shows gallbladder wall thickening and dilatation. OR for diagnostic 

laparoscopy with Dr. staton today


- Broad-spectrum antibiotics with cefepime and Flagyl


- F/u culture.  Wbc downtrending


- Vascular surgery Dr. Freeman may do mesenteric artery stenting depending on 

clinical course


- Consider GI consult and EGD/colonoscopy if not improving


- Stool for Hemoccult, stool H pylori-follow-up





: 


- Monitor renal function closely. Accurate I/O





HEME: 


- Leukocytosis most likely secondary to sepsis, however mesenteric ischemia 

cannot be ruled out completely at this time


- Leukocytosis trending down with IV antibiotics





ENDO: 


- Persistent hypokalemia.  Continue scheduled and when necessary replacement


- Continue magnesium replacement also


- Electrolyte replacement per protocol


- Sliding scale insulin





PROPH: 


- Bilateral lower extremity SCDs. Lovenox, Famotidine





LINES: 


- Utilize peripheral IVs, central line if needed





Level 2











Evangelist Delgado MD Nov 24, 2017 08:11

## 2017-11-24 NOTE — MP
cc:

DALTON GOVEA M.D.

****

 

 

DATE OF SURGERY:

11/24/2017

 

PREOPERATIVE DIAGNOSIS:

1. Persistent abdominal pain.

2. History of metastatic prostate cancer.

 

POSTOPERATIVE DIAGNOSIS

1. Persistent abdominal pain.

2. History of metastatic prostate cancer.

3. Gangrenous cholecystitis.

 

PROCEDURE PERFORMED

1. Diagnostic laparoscopy

2. Laparoscopic cholecystectomy

 

SURGEON

Dalton Govea MD.

 

ANESTHESIA

General endotracheal

 

COMPLICATIONS

None.

 

INDICATIONS FOR PROCEDURE

Mr. Guillen is a 77-year-old gentleman who has had about a 2-week history of

abdominal pain.  This occurred shortly after getting a shot for his prostate

cancer. He had a very extensive workup at the [facility] which has not

revealed the etiology of his abdominal pain. There was concern about some

possible mesenteric ischemia due to his atherosclerosis, noted on his CT scan.

He is transferred over Dr. Gregor Freeman who evaluated him and was concerned

about possible cholecystitis.

 

A general surgery consult was obtained.  Should be noted the patient has had

hiatus scan of the outside facility which was read as "normal".  He also had an

ultrasound showed a thickened gallbladder wall without pericholecystic fluid or

edema.  His LFTs were also normal.  The patient did have a marked elevation of

white count in the 20s.  Because it was persistent pain.

 

 

 

 

He was offered diagnostic laparoscopy possible cholecystectomy.  Risks and

benefits were reviewed the family and they were agreeable.

 

DETAILS:

The patient was identified, brought to the operating placed supine on the

table.  After adequate general tracheal anesthesia achieved the abdomen was

prepped and draped in standard surgical fashion.  Infraumbilical space was

anesthetized with 0.25%  Marcaine.  Infraumbilical incision was made.

Dissection was carried down subcutaneous tissue midline fascia.  Midline fascia

was incised sharply.  A finger was placed a peritoneal cavity without

difficulty.  A blunt balloon trocar was inserted and was insufflated 15 mm

using CO2 gas.  Next two 5 mm trocars were placed in the right upper quadrant

after anesthetizing the skin and subcutaneous tissue 0.25% Marcaine.

 

Review of the abdominal cavity revealed no gross abnormalities.  Normal

abdomen.  Small bowel and colon were seen and noted be of normal size and

caliber, normal size and color and were seen to be actively parastalsing.

Attention was now directed right upper quadrant.  The right upper quadrant.

Omentum was encasing the liver.  The omentum was then peeled down and

immediately we identified a gangrenous gallbladder.  There was multiple areas

of necrosis.  As soon was we attempted to grab the gallbladder it ruptured.

Suction  was then introduced and the bile was removed from the

abdominal cavity.  Gallbladder was then elevated cephalad.

 

The gallbladder was completely gangrenous and basically fell apart where ever

we grabbed it. The gallbladder neck was carefully dissected with

hydrodissection.  The cystic artery and cystic duct were identified.  Cystic

duct was also noted to have some ischemic changes.  We placed two clips

proximally as far as we could and one clip distally and then clipped the artery

as well.  The gallbladder was then removed from the hepatic fossa using

electrocautery Bovie.  Gallbladder was placed into an Endopouch bag and brought

to the infraumbilical port.  Gallbladder inspected and found be necrotic and

sent to pathology for analysis.

 

Next the abdominal cavity revisualized.  Liver bed was completely hemostatic.

The right upper quadrant where was rinsed out with 2 liters warm saline

solution.  Clips were inspected on the cystic artery and cystic duct stump.

There is no gross leakage of bile from the cystic duct stump.  There is no

bleeding from the cystic artery.  Because of the necrotic changes in the

purulent in the right upper quadrant I have elected to place a 7-[place]

Neptali-Sanchez drain in the hepatic fossa adjacent to the clips.  I was also

concerned about the viability of the cystic duct as it was quite thin and did

have some ischemic changes.  As stated we did go as proximally as we could on

it without compromising the common bile duct.

 

Drain was placed and secured with 3-0 nylon suture.  Again the right upper

quadrant as irrigated out a second time.  The fluid was noted be clear.  Clips

were checked one more time found to be intact without evidence of leakage of

bile or bleeding.  All trocars were then removed under direct vision.  Drain

was secured with 3-0 nylon.  Midline fascia was closed with 0 Vicryl in a

figure-of-eight fashion.  Skin was closed with 4-0 Monocryl.  The patient

tolerated the procedure well, was awakened, brought to recovery in stable

condition.

 

                               _________________________________

                              MD ANN Mantilla/shalini

D:  11/24/2017/9:06 AM

T:  11/24/2017/9:15 AM

Visit #:  [id number]

Job #:  [id number]

## 2017-11-25 VITALS
HEART RATE: 66 BPM | TEMPERATURE: 97 F | RESPIRATION RATE: 20 BRPM | SYSTOLIC BLOOD PRESSURE: 158 MMHG | DIASTOLIC BLOOD PRESSURE: 75 MMHG | OXYGEN SATURATION: 95 %

## 2017-11-25 VITALS
HEART RATE: 70 BPM | DIASTOLIC BLOOD PRESSURE: 77 MMHG | SYSTOLIC BLOOD PRESSURE: 173 MMHG | OXYGEN SATURATION: 96 % | RESPIRATION RATE: 20 BRPM | TEMPERATURE: 96.3 F

## 2017-11-25 VITALS
HEART RATE: 71 BPM | RESPIRATION RATE: 18 BRPM | SYSTOLIC BLOOD PRESSURE: 159 MMHG | TEMPERATURE: 96.7 F | OXYGEN SATURATION: 97 % | DIASTOLIC BLOOD PRESSURE: 69 MMHG

## 2017-11-25 VITALS
HEART RATE: 69 BPM | OXYGEN SATURATION: 94 % | DIASTOLIC BLOOD PRESSURE: 95 MMHG | RESPIRATION RATE: 18 BRPM | SYSTOLIC BLOOD PRESSURE: 159 MMHG | TEMPERATURE: 97.2 F

## 2017-11-25 VITALS
RESPIRATION RATE: 18 BRPM | DIASTOLIC BLOOD PRESSURE: 81 MMHG | SYSTOLIC BLOOD PRESSURE: 177 MMHG | OXYGEN SATURATION: 96 % | HEART RATE: 80 BPM | TEMPERATURE: 97.3 F

## 2017-11-25 VITALS
HEART RATE: 74 BPM | DIASTOLIC BLOOD PRESSURE: 67 MMHG | TEMPERATURE: 97.3 F | SYSTOLIC BLOOD PRESSURE: 166 MMHG | OXYGEN SATURATION: 93 % | RESPIRATION RATE: 20 BRPM

## 2017-11-25 VITALS
TEMPERATURE: 97.2 F | HEART RATE: 76 BPM | DIASTOLIC BLOOD PRESSURE: 83 MMHG | OXYGEN SATURATION: 96 % | SYSTOLIC BLOOD PRESSURE: 180 MMHG | RESPIRATION RATE: 20 BRPM

## 2017-11-25 VITALS — HEART RATE: 69 BPM

## 2017-11-25 LAB
ALP SERPL-CCNC: 78 U/L (ref 45–117)
ALT SERPL-CCNC: 28 U/L (ref 12–78)
ANION GAP SERPL CALC-SCNC: 9 MEQ/L (ref 5–15)
AST SERPL-CCNC: 45 U/L (ref 15–37)
BASOPHILS # BLD AUTO: 0 TH/MM3 (ref 0–0.2)
BASOPHILS NFR BLD: 0.1 % (ref 0–2)
BILIRUB SERPL-MCNC: 0.2 MG/DL (ref 0.2–1)
BUN SERPL-MCNC: 17 MG/DL (ref 7–18)
CALCIUM TP COR SERPL-MCNC: 7.2 MG/DL (ref 8.5–10.1)
CHLORIDE SERPL-SCNC: 112 MEQ/L (ref 98–107)
EOSINOPHIL # BLD: 0 TH/MM3 (ref 0–0.4)
EOSINOPHIL NFR BLD: 0 % (ref 0–4)
ERYTHROCYTE [DISTWIDTH] IN BLOOD BY AUTOMATED COUNT: 14.2 % (ref 11.6–17.2)
GFR SERPLBLD BASED ON 1.73 SQ M-ARVRAT: 63 ML/MIN (ref 89–?)
HCO3 BLD-SCNC: 19.3 MEQ/L (ref 21–32)
HCT VFR BLD CALC: 31.4 % (ref 39–51)
HEMO FLAGS: (no result)
LYMPHOCYTES # BLD AUTO: 1.3 TH/MM3 (ref 1–4.8)
LYMPHOCYTES NFR BLD AUTO: 7.3 % (ref 9–44)
MAGNESIUM SERPL-MCNC: 2.3 MG/DL (ref 1.5–2.5)
MCH RBC QN AUTO: 30.1 PG (ref 27–34)
MCHC RBC AUTO-ENTMCNC: 33.2 % (ref 32–36)
MCV RBC AUTO: 90.7 FL (ref 80–100)
MONOCYTES NFR BLD: 7.9 % (ref 0–8)
NEUTROPHILS # BLD AUTO: 14.6 TH/MM3 (ref 1.8–7.7)
NEUTROPHILS NFR BLD AUTO: 84.7 % (ref 16–70)
PLATELET # BLD: 328 TH/MM3 (ref 150–450)
POTASSIUM SERPL-SCNC: 4.1 MEQ/L (ref 3.5–5.1)
RBC # BLD AUTO: 3.46 MIL/MM3 (ref 4.5–5.9)
SODIUM SERPL-SCNC: 140 MEQ/L (ref 136–145)
WBC # BLD AUTO: 17.2 TH/MM3 (ref 4–11)

## 2017-11-25 RX ADMIN — INSULIN ASPART SCH: 100 INJECTION, SOLUTION INTRAVENOUS; SUBCUTANEOUS at 21:36

## 2017-11-25 RX ADMIN — METRONIDAZOLE SCH MLS/HR: 500 INJECTION, SOLUTION INTRAVENOUS at 04:16

## 2017-11-25 RX ADMIN — ENOXAPARIN SODIUM SCH MG: 30 INJECTION SUBCUTANEOUS at 15:57

## 2017-11-25 RX ADMIN — CALCIUM CARBONATE (ANTACID) CHEW TAB 500 MG SCH MG: 500 CHEW TAB at 11:15

## 2017-11-25 RX ADMIN — POTASSIUM BICARBONATE AND POTASSIUM CHLORIDE EFFERVESCENT TABLETS FOR ORAL SOLUTION SCH MEQ: 1.25; .7; 1.5 TABLET, EFFERVESCENT ORAL at 20:31

## 2017-11-25 RX ADMIN — CALCIUM CARBONATE (ANTACID) CHEW TAB 500 MG SCH MG: 500 CHEW TAB at 20:24

## 2017-11-25 RX ADMIN — METRONIDAZOLE SCH MLS/HR: 500 INJECTION, SOLUTION INTRAVENOUS at 20:22

## 2017-11-25 RX ADMIN — METRONIDAZOLE SCH MLS/HR: 500 INJECTION, SOLUTION INTRAVENOUS at 13:55

## 2017-11-25 RX ADMIN — PANTOPRAZOLE SCH MG: 40 TABLET, DELAYED RELEASE ORAL at 10:30

## 2017-11-25 RX ADMIN — INSULIN ASPART SCH: 100 INJECTION, SOLUTION INTRAVENOUS; SUBCUTANEOUS at 18:44

## 2017-11-25 RX ADMIN — METOPROLOL TARTRATE SCH MG: 25 TABLET, FILM COATED ORAL at 10:30

## 2017-11-25 RX ADMIN — CEFEPIME SCH MLS/HR: 2 INJECTION, POWDER, FOR SOLUTION INTRAVENOUS at 20:31

## 2017-11-25 RX ADMIN — STANDARDIZED SENNA CONCENTRATE AND DOCUSATE SODIUM SCH TAB: 8.6; 5 TABLET, FILM COATED ORAL at 10:30

## 2017-11-25 RX ADMIN — INSULIN ASPART SCH: 100 INJECTION, SOLUTION INTRAVENOUS; SUBCUTANEOUS at 02:23

## 2017-11-25 RX ADMIN — POTASSIUM CHLORIDE AND SODIUM CHLORIDE SCH MLS/HR: 900; 300 INJECTION, SOLUTION INTRAVENOUS at 05:12

## 2017-11-25 RX ADMIN — HYDROCODONE BITARTRATE AND ACETAMINOPHEN PRN TAB: 5; 325 TABLET ORAL at 13:53

## 2017-11-25 RX ADMIN — POTASSIUM BICARBONATE AND POTASSIUM CHLORIDE EFFERVESCENT TABLETS FOR ORAL SOLUTION SCH MEQ: 1.25; .7; 1.5 TABLET, EFFERVESCENT ORAL at 10:29

## 2017-11-25 RX ADMIN — MAGNESIUM OXIDE TAB 400 MG (241.3 MG ELEMENTAL MG) SCH MG: 400 (241.3 MG) TAB at 10:31

## 2017-11-25 RX ADMIN — INSULIN ASPART SCH: 100 INJECTION, SOLUTION INTRAVENOUS; SUBCUTANEOUS at 14:24

## 2017-11-25 RX ADMIN — MAGNESIUM OXIDE TAB 400 MG (241.3 MG ELEMENTAL MG) SCH MG: 400 (241.3 MG) TAB at 20:30

## 2017-11-25 RX ADMIN — INSULIN ASPART SCH: 100 INJECTION, SOLUTION INTRAVENOUS; SUBCUTANEOUS at 10:26

## 2017-11-25 RX ADMIN — ATORVASTATIN CALCIUM SCH MG: 40 TABLET, FILM COATED ORAL at 20:24

## 2017-11-25 RX ADMIN — METOPROLOL TARTRATE SCH MG: 25 TABLET, FILM COATED ORAL at 20:24

## 2017-11-25 RX ADMIN — CEFEPIME SCH MLS/HR: 2 INJECTION, POWDER, FOR SOLUTION INTRAVENOUS at 10:34

## 2017-11-25 RX ADMIN — INSULIN ASPART SCH: 100 INJECTION, SOLUTION INTRAVENOUS; SUBCUTANEOUS at 06:19

## 2017-11-25 RX ADMIN — STANDARDIZED SENNA CONCENTRATE AND DOCUSATE SODIUM SCH TAB: 8.6; 5 TABLET, FILM COATED ORAL at 20:24

## 2017-11-25 RX ADMIN — ASPIRIN SCH MG: 325 TABLET ORAL at 13:56

## 2017-11-25 RX ADMIN — ENALAPRILAT PRN MG: 1.25 INJECTION, SOLUTION INTRAVENOUS at 13:54

## 2017-11-25 NOTE — HHI.PR
__________________________________________________





Subjective


Subjective Notes


feels well, pain controlled, tolerating PO





Objective


Vitals/I&O





Vital Signs








  Date Time  Temp Pulse Resp B/P (MAP) Pulse Ox O2 Delivery O2 Flow Rate FiO2


 


11/25/17 12:00 96.3 70 20 202/77 (118) 96   





    173/76 (108)    


 


11/25/17 08:51        21


 


11/24/17 19:00      Room Air  


 


11/24/17 17:23       2.00 








Labs





Laboratory Tests








Test


  11/24/17


14:45 11/24/17


16:01 11/25/17


04:20


 


Potassium Level 3.8   4.1 


 


Phosphorus Level 1.0   


 


Magnesium Level 2.5   2.3 


 


White Blood Count   17.2 


 


Red Blood Count   3.46 


 


Hemoglobin   10.4 


 


Hematocrit   31.4 


 


Mean Corpuscular Volume   90.7 


 


Mean Corpuscular Hemoglobin   30.1 


 


Mean Corpuscular Hemoglobin


Concent 


  


  33.2 


 


 


Red Cell Distribution Width   14.2 


 


Platelet Count   328 


 


Mean Platelet Volume   8.1 


 


Neutrophils (%) (Auto)   84.7 


 


Lymphocytes (%) (Auto)   7.3 


 


Monocytes (%) (Auto)   7.9 


 


Eosinophils (%) (Auto)   0.0 


 


Basophils (%) (Auto)   0.1 


 


Neutrophils # (Auto)   14.6 


 


Lymphocytes # (Auto)   1.3 


 


Monocytes # (Auto)   1.4 


 


Eosinophils # (Auto)   0.0 


 


Basophils # (Auto)   0.0 


 


CBC Comment   DIFF FINAL 


 


Differential Comment    


 


Blood Urea Nitrogen   17 


 


Creatinine   1.13 


 


Random Glucose   130 


 


Total Protein   5.9 


 


Albumin   2.0 


 


Calcium Level   6.6 


 


Alkaline Phosphatase   78 


 


Aspartate Amino Transf


(AST/SGOT) 


  


  45 


 


 


Alanine Aminotransferase


(ALT/SGPT) 


  


  28 


 


 


Total Bilirubin   0.2 


 


Sodium Level   140 


 


Chloride Level   112 


 


Carbon Dioxide Level   19.3 


 


Anion Gap   9 


 


Estimat Glomerular Filtration


Rate 


  


  63 


 


 


Protein Corrected Calcium   7.2 














 Date/Time


Source Procedure


Growth Status


 


 


 11/23/17 05:19


Blood Peripheral Aerobic Blood Culture - Preliminary


NO GROWTH IN 2 DAYS Resulted


 


 11/23/17 05:19


Blood Peripheral Anaerobic Blood Culture - Preliminary


NO GROWTH IN 2 DAYS Resulted








Abdomen:  Non-distended, Post-op tenderness


Narrative Exam


OSVALDO drain clear





A/P


Assessment and Plan


76yo male s/p lap modesto for gangrenous cholecystitis, stable.





continue OOB


pain controlled


tolerating PO


continue ABX and OSVALDO


possibly home next 24-48h if continue to improve


home health for OSVALDO











Usman Marsh MD Nov 25, 2017 14:08

## 2017-11-25 NOTE — PD.VS.PN
Subjective


Subjective/Hospital Course


POD#1 s/p lap modesto


looks great this morning; tolerating regular diet without postprandial pain





Objective


Vitals/I&O











  Date Time  Temp Pulse Resp B/P (MAP) Pulse Ox O2 Delivery O2 Flow Rate FiO2


 


11/25/17 04:38 97.3 80 18 177/81 (113) 96   


 


11/25/17 00:56 96.7 71 18 159/69 (99) 97   


 


11/24/17 20:00 97.2 79 18 153/70 (97) 92   


 


11/24/17 20:00  87      


 


11/24/17 19:57     96   


 


11/24/17 19:00     94 Room Air  


 


11/24/17 18:00  88      


 


11/24/17 17:23     100 Nasal Cannula 2.00 


 


11/24/17 16:00  79      


 


11/24/17 16:00 98.6 78 26 154/98 (116) 99   


 


11/24/17 14:00  75      


 


11/24/17 12:00  79      


 


11/24/17 12:00 98.4 73 24 164/73 (103) 99   


 


11/24/17 10:00  85      


 


11/24/17 09:45 98.4 77 24 133/60 (84) 96 Nasal Cannula 2 


 


11/24/17 09:30  80 24 134/60 (84) 96 Nasal Cannula 2 


 


11/24/17 09:15  91 24 131/59 (83) 94 Nasal Cannula 2 


 


11/24/17 09:11 98.7 94 24 138/61 (86) 93 Nasal Cannula 2 














 11/25/17 11/25/17 11/25/17





 07:00 15:00 23:00


 


Intake Total 1050 ml  


 


Output Total 650 ml  


 


Balance 400 ml  








Physical Exam


abdomen minimally tender, no peritonitis


R UQ drain in place


Laboratory





Laboratory Tests








Test


  11/24/17


14:45 11/24/17


16:01 11/25/17


04:20


 


Potassium Level 3.8   4.1 


 


Phosphorus Level 1.0   


 


Magnesium Level 2.5   2.3 


 


White Blood Count   17.2 


 


Red Blood Count   3.46 


 


Hemoglobin   10.4 


 


Hematocrit   31.4 


 


Mean Corpuscular Volume   90.7 


 


Mean Corpuscular Hemoglobin   30.1 


 


Mean Corpuscular Hemoglobin


Concent 


  


  33.2 


 


 


Red Cell Distribution Width   14.2 


 


Platelet Count   328 


 


Mean Platelet Volume   8.1 


 


Neutrophils (%) (Auto)   84.7 


 


Lymphocytes (%) (Auto)   7.3 


 


Monocytes (%) (Auto)   7.9 


 


Eosinophils (%) (Auto)   0.0 


 


Basophils (%) (Auto)   0.1 


 


Neutrophils # (Auto)   14.6 


 


Lymphocytes # (Auto)   1.3 


 


Monocytes # (Auto)   1.4 


 


Eosinophils # (Auto)   0.0 


 


Basophils # (Auto)   0.0 


 


CBC Comment   DIFF FINAL 


 


Differential Comment    


 


Blood Urea Nitrogen   17 


 


Creatinine   1.13 


 


Random Glucose   130 


 


Total Protein   5.9 


 


Albumin   2.0 


 


Calcium Level   6.6 


 


Alkaline Phosphatase   78 


 


Aspartate Amino Transf


(AST/SGOT) 


  


  45 


 


 


Alanine Aminotransferase


(ALT/SGPT) 


  


  28 


 


 


Total Bilirubin   0.2 


 


Sodium Level   140 


 


Chloride Level   112 


 


Carbon Dioxide Level   19.3 


 


Anion Gap   9 


 


Estimat Glomerular Filtration


Rate 


  


  63 


 


 


Protein Corrected Calcium   7.2 














 Date/Time


Source Procedure


Growth Status


 


 


 11/23/17 05:19


Blood Peripheral Aerobic Blood Culture - Preliminary


NO GROWTH IN 1 DAY Resulted


 


 11/23/17 05:19


Blood Peripheral Anaerobic Blood Culture - Preliminary


NO GROWTH IN 1 DAY Resulted











Assessment and Plan


Plan


Pt underwent lap modesto yesterday for what intra-operatively appeared to be 

gangrenous cholecystitis


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





Reg diet


ok to d/c home from a vascular surgery standpoint


I will see him in my clinic in about a month and will follow long-term for 

visceral artery occlusive disease








Gregor Freeman MD FACS RPVI





Associate Professor of Surgery


[facility] - Heart and Vascular Surgery at [facility]





[phone]











Gregor Freeman MD Nov 25, 2017 08:38

## 2017-11-25 NOTE — HHI.PR
Subjective


Remarks


Pt states he feels much better. Denies any pain at this time. Pt tolerating po. 

no nausea or vomiting





Objective


Vitals





Vital Signs








  Date Time  Temp Pulse Resp B/P (MAP) Pulse Ox O2 Delivery O2 Flow Rate FiO2


 


11/25/17 08:00 97.3 74 20 166/67 (100) 93   


 


11/25/17 04:38 97.3 80 18 177/81 (113) 96   


 


11/25/17 00:56 96.7 71 18 159/69 (99) 97   


 


11/24/17 20:00 97.2 79 18 153/70 (97) 92   


 


11/24/17 20:00  87      


 


11/24/17 19:57     96   


 


11/24/17 19:00     94 Room Air  


 


11/24/17 18:00  88      


 


11/24/17 17:23     100 Nasal Cannula 2.00 


 


11/24/17 16:00  79      


 


11/24/17 16:00 98.6 78 26 154/98 (116) 99   


 


11/24/17 14:00  75      


 


11/24/17 12:00  79      


 


11/24/17 12:00 98.4 73 24 164/73 (103) 99   














I/O      


 


 11/24/17 11/24/17 11/24/17 11/25/17 11/25/17 11/25/17





 07:00 15:00 23:00 07:00 15:00 23:00


 


Intake Total 1927 ml 1000 ml 3150 ml 1050 ml  


 


Output Total 700 ml 90 ml 2400 ml 650 ml  


 


Balance 1227 ml 910 ml 750 ml 400 ml  


 


      


 


Intake Oral 240 ml 0 ml 1500 ml   


 


IV Total 1687 ml 1000 ml 1650 ml 1050 ml  


 


Output Urine Total 700 ml  2100 ml 600 ml  


 


Stool Total   250 ml   


 


Drainage Total  40 ml 50 ml 50 ml  


 


Estimated Blood Loss  50 ml    


 


# Voids 1 0    


 


# Bowel Movements 1     








Result Diagram:  


11/25/17 0420                                                                  

              11/25/17 0420





Imaging





Last Impressions








Gall Bladder Ultrasound 11/22/17 0000 Signed





Impressions: 





 Service Date/Time:  Wednesday, November 22, 2017 17:24 - CONCLUSION:  1. 





 Hepatomegaly without focal lesion. 2. Gallbladder wall thickening and dilation 





 of the common bile duct without demonstrable gallstones.  May consider 





 performing hepatobiliary tract scan to evaluate for acalculous cholecystitis. 

   





  Edmund Cardona MD 


 


Chest X-Ray 11/22/17 0000 Signed





Impressions: 





 Service Date/Time:  Wednesday, November 22, 2017 14:01 - CONCLUSION: Normal 





 examination.       Donell Cornelius MD 








Objective Remarks


GENERAL: Alert awake. laying in bed, appears comfortable.


EYES: EOMI


ENT:  Airway patent. 


NECK: Trachea midline. 


CARDIOVASCULAR: Regular rate and rhythm without murmurs 


RESPIRATORY: Clear to auscultation. Breath sounds equal bilaterally. No wheezes 


GASTROINTESTINAL: Abdomen soft, non tender at this time, no guarding, steri's 

in place, drain in place w serous drainage


NEUROLOGICAL: AO x3. No focal deficits.





A/P


Assessment and Plan


Cholecystitis, with sepsis


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


Hypokalemia, hypomagnesemia


COPD


Type 2 diabetes


Dyslipidemia


Metastatic prostate cancer





PLAN:


NEURO: 


- norco as needed w Morphine for breakthrough.  





RESP: 


- Nasal cannula oxygen.  DuoNeb every 6 hours when necessary


- Strongly advised to quit smoking





CV: 


- Normal saline w KCl maintenance 100 ml per hour. Pt is tolerating PO. HLIV


- Lactic acid normal





GI/ID: 


- regular diet. 


- GB US -shows gallbladder wall thickening and dilatation. s/p diagnostic 

laparoscopy with Dr. wilkinson  


- Broad-spectrum antibiotics with cefepime and Flagyl


- Blood culture neg x 2days.  Wbc downtrending


- Vascular surgery Dr. Freeman, following, per his note, pt has visceral artery 

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 Pt can be discharged home 

from his standpoint. f.u w him in 1 month and will follow long-term for 

visceral artery occlusive disease


- Consider GI consult and EGD/colonoscopy if not improving


- Stool for Hemoccult, stool H pylori-follow-up





: 


- Monitor renal function closely. Accurate I/O





HEME: 


- Leukocytosis trending down with IV antibiotics





ENDO: 


- Persistent hypokalemia, now resolved.   


- Continue magnesium replacement also


- Replace calcium.


- ISS





PROPH: 


- Bilateral lower extremity SCDs. Lovenox, Famotidine


Discharge Planning


per primary team.











Angelica Grady MD Nov 25, 2017 11:04

## 2017-11-26 VITALS
RESPIRATION RATE: 18 BRPM | SYSTOLIC BLOOD PRESSURE: 164 MMHG | DIASTOLIC BLOOD PRESSURE: 70 MMHG | OXYGEN SATURATION: 97 % | TEMPERATURE: 96.7 F | HEART RATE: 71 BPM

## 2017-11-26 VITALS
OXYGEN SATURATION: 96 % | TEMPERATURE: 97.8 F | RESPIRATION RATE: 20 BRPM | SYSTOLIC BLOOD PRESSURE: 166 MMHG | DIASTOLIC BLOOD PRESSURE: 75 MMHG | HEART RATE: 84 BPM

## 2017-11-26 VITALS
RESPIRATION RATE: 16 BRPM | HEART RATE: 70 BPM | TEMPERATURE: 98 F | OXYGEN SATURATION: 94 % | SYSTOLIC BLOOD PRESSURE: 162 MMHG | DIASTOLIC BLOOD PRESSURE: 75 MMHG

## 2017-11-26 VITALS
HEART RATE: 71 BPM | SYSTOLIC BLOOD PRESSURE: 166 MMHG | OXYGEN SATURATION: 96 % | TEMPERATURE: 98.5 F | DIASTOLIC BLOOD PRESSURE: 74 MMHG | RESPIRATION RATE: 22 BRPM

## 2017-11-26 VITALS
SYSTOLIC BLOOD PRESSURE: 193 MMHG | OXYGEN SATURATION: 97 % | HEART RATE: 72 BPM | DIASTOLIC BLOOD PRESSURE: 81 MMHG | RESPIRATION RATE: 20 BRPM | TEMPERATURE: 97.8 F

## 2017-11-26 LAB
ANION GAP SERPL CALC-SCNC: 10 MEQ/L (ref 5–15)
ANION GAP SERPL CALC-SCNC: 6 MEQ/L (ref 5–15)
BASOPHILS # BLD AUTO: 0.1 TH/MM3 (ref 0–0.2)
BASOPHILS NFR BLD: 0.4 % (ref 0–2)
BUN SERPL-MCNC: 13 MG/DL (ref 7–18)
BUN SERPL-MCNC: 13 MG/DL (ref 7–18)
CALCIUM TP COR SERPL-MCNC: 7.3 MG/DL (ref 8.5–10.1)
CALCIUM TP COR SERPL-MCNC: 7.3 MG/DL (ref 8.5–10.1)
CHLORIDE SERPL-SCNC: 109 MEQ/L (ref 98–107)
CHLORIDE SERPL-SCNC: 110 MEQ/L (ref 98–107)
EOSINOPHIL # BLD: 0.4 TH/MM3 (ref 0–0.4)
EOSINOPHIL NFR BLD: 2.8 % (ref 0–4)
ERYTHROCYTE [DISTWIDTH] IN BLOOD BY AUTOMATED COUNT: 14.8 % (ref 11.6–17.2)
GFR SERPLBLD BASED ON 1.73 SQ M-ARVRAT: 68 ML/MIN (ref 89–?)
GFR SERPLBLD BASED ON 1.73 SQ M-ARVRAT: 71 ML/MIN (ref 89–?)
HCO3 BLD-SCNC: 18 MEQ/L (ref 21–32)
HCO3 BLD-SCNC: 20.9 MEQ/L (ref 21–32)
HCT VFR BLD CALC: 28.7 % (ref 39–51)
HEMO FLAGS: (no result)
LYMPHOCYTES # BLD AUTO: 1.7 TH/MM3 (ref 1–4.8)
LYMPHOCYTES NFR BLD AUTO: 11.2 % (ref 9–44)
MAGNESIUM SERPL-MCNC: 1.9 MG/DL (ref 1.5–2.5)
MCH RBC QN AUTO: 31 PG (ref 27–34)
MCHC RBC AUTO-ENTMCNC: 34.2 % (ref 32–36)
MCV RBC AUTO: 90.5 FL (ref 80–100)
MONOCYTES NFR BLD: 8 % (ref 0–8)
NEUTROPHILS # BLD AUTO: 11.6 TH/MM3 (ref 1.8–7.7)
NEUTROPHILS NFR BLD AUTO: 77.6 % (ref 16–70)
PLATELET # BLD: 308 TH/MM3 (ref 150–450)
POTASSIUM SERPL-SCNC: 4 MEQ/L (ref 3.5–5.1)
POTASSIUM SERPL-SCNC: 4 MEQ/L (ref 3.5–5.1)
RBC # BLD AUTO: 3.17 MIL/MM3 (ref 4.5–5.9)
SODIUM SERPL-SCNC: 137 MEQ/L (ref 136–145)
SODIUM SERPL-SCNC: 137 MEQ/L (ref 136–145)
WBC # BLD AUTO: 14.9 TH/MM3 (ref 4–11)

## 2017-11-26 RX ADMIN — INSULIN ASPART SCH: 100 INJECTION, SOLUTION INTRAVENOUS; SUBCUTANEOUS at 02:00

## 2017-11-26 RX ADMIN — ENOXAPARIN SODIUM SCH MG: 30 INJECTION SUBCUTANEOUS at 15:51

## 2017-11-26 RX ADMIN — METRONIDAZOLE SCH MLS/HR: 500 INJECTION, SOLUTION INTRAVENOUS at 11:44

## 2017-11-26 RX ADMIN — ATORVASTATIN CALCIUM SCH MG: 40 TABLET, FILM COATED ORAL at 20:39

## 2017-11-26 RX ADMIN — INSULIN ASPART SCH: 100 INJECTION, SOLUTION INTRAVENOUS; SUBCUTANEOUS at 13:38

## 2017-11-26 RX ADMIN — INSULIN ASPART SCH: 100 INJECTION, SOLUTION INTRAVENOUS; SUBCUTANEOUS at 18:30

## 2017-11-26 RX ADMIN — INSULIN ASPART SCH: 100 INJECTION, SOLUTION INTRAVENOUS; SUBCUTANEOUS at 20:48

## 2017-11-26 RX ADMIN — INSULIN ASPART SCH: 100 INJECTION, SOLUTION INTRAVENOUS; SUBCUTANEOUS at 09:17

## 2017-11-26 RX ADMIN — CEFEPIME SCH MLS/HR: 2 INJECTION, POWDER, FOR SOLUTION INTRAVENOUS at 08:29

## 2017-11-26 RX ADMIN — METRONIDAZOLE SCH MLS/HR: 500 INJECTION, SOLUTION INTRAVENOUS at 04:45

## 2017-11-26 RX ADMIN — CALCIUM CARBONATE (ANTACID) CHEW TAB 500 MG SCH MG: 500 CHEW TAB at 20:39

## 2017-11-26 RX ADMIN — POTASSIUM BICARBONATE AND POTASSIUM CHLORIDE EFFERVESCENT TABLETS FOR ORAL SOLUTION SCH MEQ: 1.25; .7; 1.5 TABLET, EFFERVESCENT ORAL at 08:30

## 2017-11-26 RX ADMIN — METRONIDAZOLE SCH MLS/HR: 500 INJECTION, SOLUTION INTRAVENOUS at 20:36

## 2017-11-26 RX ADMIN — STANDARDIZED SENNA CONCENTRATE AND DOCUSATE SODIUM SCH TAB: 8.6; 5 TABLET, FILM COATED ORAL at 08:31

## 2017-11-26 RX ADMIN — ENALAPRILAT PRN MG: 1.25 INJECTION, SOLUTION INTRAVENOUS at 05:24

## 2017-11-26 RX ADMIN — POTASSIUM BICARBONATE AND POTASSIUM CHLORIDE EFFERVESCENT TABLETS FOR ORAL SOLUTION SCH MEQ: 1.25; .7; 1.5 TABLET, EFFERVESCENT ORAL at 20:41

## 2017-11-26 RX ADMIN — CALCIUM CARBONATE (ANTACID) CHEW TAB 500 MG SCH MG: 500 CHEW TAB at 08:30

## 2017-11-26 RX ADMIN — MAGNESIUM OXIDE TAB 400 MG (241.3 MG ELEMENTAL MG) SCH MG: 400 (241.3 MG) TAB at 08:31

## 2017-11-26 RX ADMIN — METOPROLOL TARTRATE SCH MG: 25 TABLET, FILM COATED ORAL at 08:31

## 2017-11-26 RX ADMIN — INSULIN ASPART SCH: 100 INJECTION, SOLUTION INTRAVENOUS; SUBCUTANEOUS at 04:45

## 2017-11-26 RX ADMIN — STANDARDIZED SENNA CONCENTRATE AND DOCUSATE SODIUM SCH TAB: 8.6; 5 TABLET, FILM COATED ORAL at 20:39

## 2017-11-26 RX ADMIN — CEFEPIME SCH MLS/HR: 2 INJECTION, POWDER, FOR SOLUTION INTRAVENOUS at 20:37

## 2017-11-26 RX ADMIN — PANTOPRAZOLE SCH MG: 40 TABLET, DELAYED RELEASE ORAL at 08:31

## 2017-11-26 RX ADMIN — METOPROLOL TARTRATE SCH MG: 25 TABLET, FILM COATED ORAL at 20:39

## 2017-11-26 RX ADMIN — ASPIRIN SCH MG: 325 TABLET ORAL at 08:31

## 2017-11-26 RX ADMIN — ENALAPRILAT PRN MG: 1.25 INJECTION, SOLUTION INTRAVENOUS at 15:51

## 2017-11-26 NOTE — PD.VS.PN
Subjective


Subjective/Hospital Course


POD#2 s/p lap modesto


c/o abdominal soreness but not related to po intake; indira reg diet





Objective


Vitals/I&O











  Date Time  Temp Pulse Resp B/P (MAP) Pulse Ox O2 Delivery O2 Flow Rate FiO2


 


11/26/17 04:00 97.8 84 20 166/75 (105) 96   


 


11/25/17 23:55 97.0 66 20 158/75 (102) 95   


 


11/25/17 20:19  69      


 


11/25/17 20:00 97.2 76 20 180/83 (115) 96   


 


11/25/17 16:00 97.2 69 18 159/95 (116) 94   


 


11/25/17 12:00 96.3 70 20 202/77 (118) 96   





    173/76 (108)    


 


11/25/17 09:00      Room Air 2.00 21


 


11/25/17 08:51        21


 


11/25/17 08:00 97.3 74 20 166/67 (100) 93   














 11/26/17 11/26/17 11/26/17





 07:00 15:00 23:00


 


Intake Total 530 ml  


 


Output Total 680 ml  


 


Balance -150 ml  








Physical Exam


abdomen minimally tender


Laboratory





Laboratory Tests








Test


  11/26/17


06:40














 Date/Time


Source Procedure


Growth Status


 


 


 11/23/17 05:19


Blood Peripheral Aerobic Blood Culture - Preliminary


NO GROWTH IN 2 DAYS Resulted


 


 11/23/17 05:19


Blood Peripheral Anaerobic Blood Culture - Preliminary


NO GROWTH IN 2 DAYS Resulted











Assessment and Plan


Plan


asymptomatic visceral artery occlusive disease


ok to d/c from vascular surgery standpoint


will arrange outpatient f/u with mesenteric duplex








Gregor Freeman MD FACS RPVI





Associate Professor of Surgery


[facility] - Heart and Vascular Surgery at [facility]

















Greogr Freeman MD Nov 26, 2017 07:50

## 2017-11-26 NOTE — HHI.PR
Subjective


Remarks


Pt hoping to go home soon. Pain well controlled. no nausea or vomiting. states 

he had lots of loose stools this morning.





Objective


Vitals





Vital Signs








  Date Time  Temp Pulse Resp B/P (MAP) Pulse Ox O2 Delivery O2 Flow Rate FiO2


 


11/26/17 08:00 98.0 70 16 162/75 (104) 94   


 


11/26/17 04:00 97.8 84 20 166/75 (105) 96   


 


11/25/17 23:55 97.0 66 20 158/75 (102) 95   


 


11/25/17 20:19  69      


 


11/25/17 20:00 97.2 76 20 180/83 (115) 96   


 


11/25/17 16:00 97.2 69 18 159/95 (116) 94   


 


11/25/17 12:00 96.3 70 20 202/77 (118) 96   





    173/76 (108)    














I/O      


 


 11/25/17 11/25/17 11/25/17 11/26/17 11/26/17 11/26/17





 07:00 15:00 23:00 07:00 15:00 23:00


 


Intake Total 1050 ml  1150 ml 530 ml  


 


Output Total 650 ml 50 ml  680 ml  


 


Balance 400 ml -50 ml 1150 ml -150 ml  


 


      


 


Intake Oral   1000 ml 480 ml  


 


IV Total 1050 ml  150 ml 50 ml  


 


Output Urine Total 600 ml   600 ml  


 


Drainage Total 50 ml 50 ml  80 ml  


 


# Voids   5 2  


 


# Bowel Movements   1 1  








Result Diagram:  


11/26/17 0640                                                                  

              11/26/17 0640





Imaging





Last Impressions








Gall Bladder Ultrasound 11/22/17 0000 Signed





Impressions: 





 Service Date/Time:  Wednesday, November 22, 2017 17:24 - CONCLUSION:  1. 





 Hepatomegaly without focal lesion. 2. Gallbladder wall thickening and dilation 





 of the common bile duct without demonstrable gallstones.  May consider 





 performing hepatobiliary tract scan to evaluate for acalculous cholecystitis. 

   





  Edmund Cardona MD 


 


Chest X-Ray 11/22/17 0000 Signed





Impressions: 





 Service Date/Time:  Wednesday, November 22, 2017 14:01 - CONCLUSION: Normal 





 examination.       Donell Cornelius MD 








Objective Remarks


GENERAL: Alert awake. laying in bed, appears comfortable.


EYES: EOMI


ENT:  Airway patent. 


NECK: Trachea midline. 


CARDIOVASCULAR: Regular rate and rhythm without murmurs 


RESPIRATORY: Clear to auscultation. Breath sounds equal bilaterally. No wheezes 


GASTROINTESTINAL: Abdomen soft, non tender at this time, no guarding, steri's 

in place, drain in place w serous drainage


NEUROLOGICAL: alert, answers questions appropriately.





A/P


Assessment and Plan


Cholecystitis, with sepsis


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


Hypokalemia, hypomagnesemia


COPD


Type 2 diabetes


Dyslipidemia


Metastatic prostate cancer





PLAN:


NEURO: 


- norco as needed w Morphine for breakthrough.  





RESP: 


- Nasal cannula oxygen.  DuoNeb every 6 hours when necessary


- Strongly advised to quit smoking





CV: 


- Pt is tolerating PO. HLIV


- Lactic acid normal





GI/ID: 


- regular diet. 


- GB US -shows gallbladder wall thickening and dilatation. s/p diagnostic 

laparoscopy with Dr. wilkinson  


- Broad-spectrum antibiotics with cefepime and Flagyl


- Blood culture neg x 2days.  Wbc down to 14.9


- Vascular surgery Dr. Freeman, following, per his note, pt has visceral artery 

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 Pt can be discharged home 

from his standpoint. f.u w him in 1 month and will follow long-term for 

visceral artery occlusive disease


- Consider GI consult and EGD/colonoscopy if not improving


- Stool for Hemoccult, stool H pylori-follow-up





: 


- Monitor renal function closely. Accurate I/O





HEME: 


- Leukocytosis trending down with IV antibiotics





ENDO: 


- Persistent hypokalemia, now resolved.   


- hypocalcemia and hypophosphatemia: currently being replaced. monitor closely


- loose stools, could be from the mag oxide. hold.  


- ISS





PROPH: 


- Bilateral lower extremity SCDs. Lovenox, Famotidine


Discharge Planning


when cleared by GS


needs correction of Calcium as well prior to d/c











Angelica Grady MD Nov 26, 2017 11:23

## 2017-11-26 NOTE — HHI.FF
Face to Face Verification


Diagnosis:  


(1) Cholecystitis


(2) Hx laparoscopic cholecystectomy


Home Health Nursing








Order: Nursing assessment with vital signs








Instructions:


OSVALDO drain management











I have seen patient Silvestre Guillen on 11/26/17. My clinical findings support the 

need for the requested home health care services because: Pt had sx and has one 

OSVALDO drain in place








 Limited ability to care for self














I certify that my clinical findings support that this patient is homebound 

because:  Pt had sx and has one OSVALDO drain in place








 Post-op weakness

















Angelica Grady MD Nov 26, 2017 11:14

## 2017-11-26 NOTE — HHI.PR
cc:   Karsten Mcghee MD


__________________________________________________





Subjective


Subjective Notes


Tolerating diet


Slightly distended still, but passing flatus





Objective


Vitals/I&O





Vital Signs








  Date Time  Temp Pulse Resp B/P (MAP) Pulse Ox O2 Delivery O2 Flow Rate FiO2


 


11/26/17 12:00 96.7 71 18 164/70 (101) 97   


 


11/25/17 09:00      Room Air 2.00 21








Labs





Laboratory Tests








Test


  11/26/17


06:40


 


White Blood Count 14.9 


 


Red Blood Count 3.17 


 


Hemoglobin 9.8 


 


Hematocrit 28.7 


 


Mean Corpuscular Volume 90.5 


 


Mean Corpuscular Hemoglobin 31.0 


 


Mean Corpuscular Hemoglobin


Concent 34.2 


 


 


Red Cell Distribution Width 14.8 


 


Platelet Count 308 


 


Mean Platelet Volume 7.6 


 


Neutrophils (%) (Auto) 77.6 


 


Lymphocytes (%) (Auto) 11.2 


 


Monocytes (%) (Auto) 8.0 


 


Eosinophils (%) (Auto) 2.8 


 


Basophils (%) (Auto) 0.4 


 


Neutrophils # (Auto) 11.6 


 


Lymphocytes # (Auto) 1.7 


 


Monocytes # (Auto) 1.2 


 


Eosinophils # (Auto) 0.4 


 


Basophils # (Auto) 0.1 


 


CBC Comment DIFF FINAL 


 


Differential Comment  


 


Blood Urea Nitrogen 13 


 


Creatinine 1.02 


 


Random Glucose 109 


 


Total Protein 5.4 


 


Calcium Level 6.5 


 


Phosphorus Level 0.7 


 


Magnesium Level 1.9 


 


Sodium Level 137 


 


Potassium Level 4.0 


 


Chloride Level 109 


 


Carbon Dioxide Level 18.0 


 


Anion Gap 10 


 


Estimat Glomerular Filtration


Rate 71 


 


 


Protein Corrected Calcium 7.3 














 Date/Time


Source Procedure


Growth Status


 


 


 11/23/17 05:19


Blood Peripheral Aerobic Blood Culture - Preliminary


NO GROWTH IN 3 DAYS Resulted


 


 11/23/17 05:19


Blood Peripheral Anaerobic Blood Culture - Preliminary


NO GROWTH IN 3 DAYS Resulted








Lungs:  Clear


Abdomen:  Non-distended, Non-tender


Narrative Exam


OSVALDO output serosanguinous


Steri strips dry


Skin without erythema





A/P


Assessment and Plan


POD #2 Lap modesto for gangrenous cholecystitis





Plan:





Continue IV antibiotics today; convert to PO tomorrow.


Leave OSVALDO in for now


Possible discharge home tomorrow with drain/HHC











Karsten Mcghee MD Nov 26, 2017 14:36

## 2017-11-27 VITALS
DIASTOLIC BLOOD PRESSURE: 73 MMHG | HEART RATE: 66 BPM | RESPIRATION RATE: 17 BRPM | SYSTOLIC BLOOD PRESSURE: 159 MMHG | OXYGEN SATURATION: 96 % | TEMPERATURE: 97 F

## 2017-11-27 VITALS
HEART RATE: 63 BPM | RESPIRATION RATE: 20 BRPM | SYSTOLIC BLOOD PRESSURE: 172 MMHG | OXYGEN SATURATION: 97 % | DIASTOLIC BLOOD PRESSURE: 76 MMHG | TEMPERATURE: 97.7 F

## 2017-11-27 VITALS
TEMPERATURE: 97.7 F | OXYGEN SATURATION: 98 % | DIASTOLIC BLOOD PRESSURE: 79 MMHG | RESPIRATION RATE: 22 BRPM | HEART RATE: 69 BPM | SYSTOLIC BLOOD PRESSURE: 184 MMHG

## 2017-11-27 VITALS
SYSTOLIC BLOOD PRESSURE: 159 MMHG | DIASTOLIC BLOOD PRESSURE: 69 MMHG | OXYGEN SATURATION: 95 % | RESPIRATION RATE: 17 BRPM | HEART RATE: 68 BPM | TEMPERATURE: 97.8 F

## 2017-11-27 LAB
ANION GAP SERPL CALC-SCNC: 8 MEQ/L (ref 5–15)
BUN SERPL-MCNC: 10 MG/DL (ref 7–18)
CALCIUM TP COR SERPL-MCNC: 7.2 MG/DL (ref 8.5–10.1)
CHLORIDE SERPL-SCNC: 109 MEQ/L (ref 98–107)
GFR SERPLBLD BASED ON 1.73 SQ M-ARVRAT: 73 ML/MIN (ref 89–?)
HCO3 BLD-SCNC: 21.9 MEQ/L (ref 21–32)
MAGNESIUM SERPL-MCNC: 1.7 MG/DL (ref 1.5–2.5)
POTASSIUM SERPL-SCNC: 3.7 MEQ/L (ref 3.5–5.1)
SODIUM SERPL-SCNC: 139 MEQ/L (ref 136–145)

## 2017-11-27 RX ADMIN — POTASSIUM BICARBONATE AND POTASSIUM CHLORIDE EFFERVESCENT TABLETS FOR ORAL SOLUTION SCH MEQ: 1.25; .7; 1.5 TABLET, EFFERVESCENT ORAL at 08:34

## 2017-11-27 RX ADMIN — CEFEPIME SCH MLS/HR: 2 INJECTION, POWDER, FOR SOLUTION INTRAVENOUS at 08:33

## 2017-11-27 RX ADMIN — ENALAPRILAT PRN MG: 1.25 INJECTION, SOLUTION INTRAVENOUS at 04:39

## 2017-11-27 RX ADMIN — INSULIN ASPART SCH: 100 INJECTION, SOLUTION INTRAVENOUS; SUBCUTANEOUS at 04:49

## 2017-11-27 RX ADMIN — CALCIUM CARBONATE (ANTACID) CHEW TAB 500 MG SCH MG: 500 CHEW TAB at 04:49

## 2017-11-27 RX ADMIN — ASPIRIN SCH MG: 325 TABLET ORAL at 08:34

## 2017-11-27 RX ADMIN — PANTOPRAZOLE SCH MG: 40 TABLET, DELAYED RELEASE ORAL at 08:33

## 2017-11-27 RX ADMIN — INSULIN ASPART SCH: 100 INJECTION, SOLUTION INTRAVENOUS; SUBCUTANEOUS at 02:00

## 2017-11-27 RX ADMIN — METOPROLOL TARTRATE SCH MG: 25 TABLET, FILM COATED ORAL at 08:34

## 2017-11-27 RX ADMIN — METRONIDAZOLE SCH MLS/HR: 500 INJECTION, SOLUTION INTRAVENOUS at 04:39

## 2017-11-27 RX ADMIN — INSULIN ASPART SCH: 100 INJECTION, SOLUTION INTRAVENOUS; SUBCUTANEOUS at 10:23

## 2017-11-27 RX ADMIN — STANDARDIZED SENNA CONCENTRATE AND DOCUSATE SODIUM SCH TAB: 8.6; 5 TABLET, FILM COATED ORAL at 08:33

## 2017-11-27 NOTE — HHI.PR
Objective


Vitals





Vital Signs








  Date Time  Temp Pulse Resp B/P (MAP) Pulse Ox O2 Delivery O2 Flow Rate FiO2


 


11/27/17 08:00 97.8 68 17 159/69 (99) 95   


 


11/27/17 04:00 97.7 63 20 172/76 (108) 97   


 


11/27/17 00:00 97.7 69 22 184/79 (114) 98   


 


11/26/17 20:00 98.5 71 22 166/74 (104) 96   


 


11/26/17 16:00 97.8 72 20 193/81 (118) 97   


 


11/26/17 12:00 96.7 71 18 164/70 (101) 97   














I/O      


 


 11/26/17 11/26/17 11/26/17 11/27/17 11/27/17 11/27/17





 07:00 15:00 23:00 07:00 15:00 23:00


 


Intake Total 530 ml 260 ml 1590 ml 530 ml 100 ml 


 


Output Total 680 ml 40 ml 30 ml 460 ml  


 


Balance -150 ml 220 ml 1560 ml 70 ml 100 ml 


 


      


 


Intake Oral 480 ml  1440 ml 480 ml  


 


IV Total 50 ml 260 ml 150 ml 50 ml 100 ml 


 


Output Urine Total 600 ml   400 ml  


 


Drainage Total 80 ml 40 ml 30 ml 60 ml  


 


# Voids 2  5 2  


 


# Bowel Movements 1  2 0  








Result Diagram:  


11/26/17 0640                                                                  

              11/27/17 0609














Humza Garza MD Nov 27, 2017 11:32

## 2017-11-27 NOTE — HHI.PR
__________________________________________________





Subjective


Subjective Notes


Ready to go home today


Feels okay to take care of drain at home without HHC


RN Lila at bedside





Objective


Vitals/I&O





Vital Signs








  Date Time  Temp Pulse Resp B/P (MAP) Pulse Ox O2 Delivery O2 Flow Rate FiO2


 


11/27/17 04:00 97.7 63 20 172/76 (108) 97   


 


11/25/17 09:00      Room Air 2.00 21








Labs





Laboratory Tests








Test


  11/26/17


15:28 11/27/17


06:09


 


Blood Urea Nitrogen 13  10 


 


Creatinine 1.06  0.99 


 


Random Glucose 166  128 


 


Total Protein 5.6  5.8 


 


Calcium Level 6.6  6.6 


 


Sodium Level 137  139 


 


Potassium Level 4.0  3.7 


 


Chloride Level 110  109 


 


Carbon Dioxide Level 20.9  21.9 


 


Anion Gap 6  8 


 


Estimat Glomerular Filtration


Rate 68 


  73 


 


 


Protein Corrected Calcium 7.3  7.2 


 


Phosphorus Level  1.0 


 


Magnesium Level  1.7 














 Date/Time


Source Procedure


Growth Status


 


 


 11/23/17 05:19


Blood Peripheral Aerobic Blood Culture - Preliminary


NO GROWTH IN 3 DAYS Resulted


 


 11/23/17 05:19


Blood Peripheral Anaerobic Blood Culture - Preliminary


NO GROWTH IN 3 DAYS Resulted








Cardiovascular:  Regular


Lungs:  Clear


Abdomen:  Non-distended, Non-tender, Other (OSVALDO with serous output; lap sites c/d

/i )


Extremities:  No edema





A/P


Assessment and Plan


77 year old male POD3 lap [place]o; gangrenous 





-Switch to PO antibiotics


-Pain controlled


-Regular diet 


-Okay to DC from GS standpoint


-Rx for pain medications and antibiotics on chart 


-Follow up with Dr. Staton on Thursday











Vira Baker Nov 27, 2017 08:52

## 2017-11-28 NOTE — HHI.DS
__________________________________________________





Discharge Summary


Admission Date


Nov 22, 2017 at 14:08


Discharge Date:  Nov 27, 2017


Admitting Diagnosis








(1) Cholecystitis


ICD Code:  K81.9 - Cholecystitis, unspecified


(2) Hypomagnesemia


ICD Code:  E83.42 - Hypomagnesemia


(3) Sepsis


ICD Code:  A41.9 - Sepsis, unspecified organism


(4) Gangrenous cholecystitis


ICD Code:  K81.0 - Acute cholecystitis


(5) Mesenteric artery stenosis


ICD Code:  K55.1 - Chronic vascular disorders of intestine


Procedures


Laparoscopic cholecystectomy


Brief History - From Admission


78 yo male with 2 weeks of abdominal pain, admitted to outside hospital and 

worked up for cholecystitis.  By report, had gallbladder sludge on US and + 

White's sign but negative HIDA.  CT showed mesenteric stenosis and transferred 

here.  The patient notes that he started with abdominal pain after a shot for 

metastatic prostate CA on 11/6.  + anorexia.  No diarrhea.  Pain has been 

persistent.


CBC/BMP:  


11/26/17 0640                                                                  

              11/27/17 0609





Significant Findings





Laboratory Tests








Test


  11/26/17


06:40 11/26/17


15:28 11/27/17


06:09


 


White Blood Count


  14.9 TH/MM3


(4.0-11.0) 


  


 


 


Red Blood Count


  3.17 MIL/MM3


(4.50-5.90) 


  


 


 


Hemoglobin


  9.8 GM/DL


(13.0-17.0) 


  


 


 


Hematocrit


  28.7 %


(39.0-51.0) 


  


 


 


Neutrophils (%) (Auto)


  77.6 %


(16.0-70.0) 


  


 


 


Neutrophils # (Auto)


  11.6 TH/MM3


(1.8-7.7) 


  


 


 


Monocytes # (Auto)


  1.2 TH/MM3


(0-0.9) 


  


 


 


Random Glucose


  109 MG/DL


() 166 MG/DL


() 128 MG/DL


()


 


Total Protein


  5.4 GM/DL


(6.4-8.2) 5.6 GM/DL


(6.4-8.2) 5.8 GM/DL


(6.4-8.2)


 


Calcium Level


  6.5 MG/DL


(8.5-10.1) 6.6 MG/DL


(8.5-10.1) 6.6 MG/DL


(8.5-10.1)


 


Phosphorus Level


  0.7 MG/DL


(2.5-4.9) 


  1.0 MG/DL


(2.5-4.9)


 


Chloride Level


  109 MEQ/L


() 110 MEQ/L


() 109 MEQ/L


()


 


Carbon Dioxide Level


  18.0 MEQ/L


(21.0-32.0) 20.9 MEQ/L


(21.0-32.0) 


 


 


Estimat Glomerular Filtration


Rate 71 ML/MIN


(>89) 68 ML/MIN


(>89) 73 ML/MIN


(>89)


 


Protein Corrected Calcium


  7.3 MG/DL


(8.5-10.1) 7.3 MG/DL


(8.5-10.1) 7.2 MG/DL


(8.5-10.1)








PE at Discharge


GENERAL: Alert awake. laying in bed, appears comfortable.


EYES: EOMI


ENT:  Airway patent. 


NECK: Trachea midline. 


CARDIOVASCULAR: Regular rate and rhythm without murmurs 


RESPIRATORY: Clear to auscultation. Breath sounds equal bilaterally. No wheezes 


GASTROINTESTINAL: Abdomen soft, non tender at this time, no guarding, steri's 

in place, drain in place w serous drainage


NEUROLOGICAL: alert, answers questions appropriately.


Hospital Course


Patient admitted initially to MICU for sepsis with cholecystitis mesenteric 

artery stenosis probably chronic, she has a history of COPD diabetes mellitus 

dyslipidemia metastatic prostate cancer, GS was consulted patient started on 

broad-spectrum antibiotic blood culture, eventually she had laparoscopic 

cholecystectomy, patient improved and cleared by GS to be discharged home


Face-to-face encounter performed with the patient on discharge day, as well as 

physical exam, summary of hospitalization course and postdischarge plan has 

been  


D/W the patient.


D/W nurse 


D/W .


Discharge medications reviewed and printed and signed, post discharge follow up 

visit with PCP and other specialist as well as Brief hospital course and 

discharge summary has been placed.


Pt Condition on Discharge:  Stable


Discharge Disposition:  Disch w/ Home Health Serv


Discharge Time:  > 30 minutes


Discharge Instructions


DIET: Follow Instructions for:  Heart Healthy Diet, Diabetic Diet


Activities you can perform:  Regular-No Restrictions


Follow up Referrals:  


PCP Follow-up - 1 Week


PCP Follow-up


Surgical - 11/30/17 with Shon Staton MD


Surgical


Vascular Surgery @  Vascular Surgery with Gregor Freeman MD


Vascular Surgery





New Medications:  


Amlodipine (Norvasc) 10 Mg Tab


10 MG PO DAILY for Blood Pressure Management for 30 Days, #30 TAB 0 Refills





Potassium Phosphate-Sodium Phosphate (K-Phos Neutral) 155-852-130 Mg Tab


500 MG PO PCHS for Electrolyte Replacement for 3 Days, #12 TAB 0 Refills





Aspirin (Px Aspirin) 325 Mg Tab


325 MG PO DAILY for Blood Clot Prevention for 30 Days, #30 TAB





Atorvastatin (Atorvastatin) 40 Mg Tab


40 MG PO HS for Cholesterol Management for 30 Days, #30 TAB





Hydrocodone/Acetaminophen (Hydrocodone-Acetamin 5-325 mg) 5 Mg-325 Mg Tablet


1 TAB PO Q4H PRN for pain, #30 TAB





Levofloxacin (Levaquin) 750 Mg Tablet


750 MG PO DAILY for Infection for 7 Days, #7 TAB





Metoprolol Tartrate (Metoprolol Tartrate) 25 Mg Tab


12.5 MG PO BID for Blood Pressure Management for 30 Days, #30 TAB





Metronidazole (Flagyl) 500 Mg Tab


500 MG PO Q8HR for Infection for 7 Days, TAB





Pantoprazole (Pantoprazole) 40 Mg Tab


40 MG PO DAILY for GERD for 30 Days, #30 TAB





[Calcium Carbonate Chew] () 500 MG CHEW


500 MG CHEW Q8H for Low Calcium for 5 Days, #15





 


Continued Medications:  


Tamsulosin (Tamsulosin) 0.4 Mg Cap


0.4 MG PO HS for Manage Prostate Problems, #30 CAP 0 Refills





 


Discontinued Medications:  


Atorvastatin (Atorvastatin) 10 Mg Tab


10 MG PO HS for Cholesterol Management, #30 TAB 0 Refills





Diltiazem ER 24 HR (Diltiazem ER 24 HR) 360 Mg Abbi


360 MG PO DAILY, #30 TAB 0 Refills





Glipizide (Glipizide) 5 Mg Tab


5 MG PO BIDAC for Blood Sugar Management, #60 TAB 0 Refills


Take 30 minutes before a meal


Lisinopril-Hctz (Lisinopril-Hctz) 20-12.5 Mg Tab


1 TAB PO DAILY for Blood Pressure Management, #30 TAB 0 Refills

















Humza Garza MD Nov 28, 2017 10:23

## 2018-01-28 ENCOUNTER — HOSPITAL ENCOUNTER (INPATIENT)
Dept: HOSPITAL 17 - NEPE | Age: 78
LOS: 5 days | Discharge: HOME | DRG: 253 | End: 2018-02-02
Attending: HOSPITALIST | Admitting: HOSPITALIST
Payer: MEDICARE

## 2018-01-28 VITALS — BODY MASS INDEX: 23.05 KG/M2 | WEIGHT: 152.12 LBS | HEIGHT: 68 IN

## 2018-01-28 VITALS
RESPIRATION RATE: 20 BRPM | TEMPERATURE: 97.8 F | HEART RATE: 62 BPM | OXYGEN SATURATION: 92 % | SYSTOLIC BLOOD PRESSURE: 168 MMHG | DIASTOLIC BLOOD PRESSURE: 67 MMHG

## 2018-01-28 DIAGNOSIS — Z85.46: ICD-10-CM

## 2018-01-28 DIAGNOSIS — N17.9: ICD-10-CM

## 2018-01-28 DIAGNOSIS — E11.9: ICD-10-CM

## 2018-01-28 DIAGNOSIS — K55.1: ICD-10-CM

## 2018-01-28 DIAGNOSIS — E83.39: ICD-10-CM

## 2018-01-28 DIAGNOSIS — Z72.0: ICD-10-CM

## 2018-01-28 DIAGNOSIS — E55.9: ICD-10-CM

## 2018-01-28 DIAGNOSIS — J44.9: ICD-10-CM

## 2018-01-28 DIAGNOSIS — I77.1: Primary | ICD-10-CM

## 2018-01-28 DIAGNOSIS — E87.6: ICD-10-CM

## 2018-01-28 DIAGNOSIS — E83.51: ICD-10-CM

## 2018-01-28 DIAGNOSIS — I10: ICD-10-CM

## 2018-01-28 PROCEDURE — 85610 PROTHROMBIN TIME: CPT

## 2018-01-28 PROCEDURE — 82948 REAGENT STRIP/BLOOD GLUCOSE: CPT

## 2018-01-28 PROCEDURE — 85025 COMPLETE CBC W/AUTO DIFF WBC: CPT

## 2018-01-28 PROCEDURE — 99285 EMERGENCY DEPT VISIT HI MDM: CPT

## 2018-01-28 PROCEDURE — 80053 COMPREHEN METABOLIC PANEL: CPT

## 2018-01-28 PROCEDURE — 83735 ASSAY OF MAGNESIUM: CPT

## 2018-01-28 PROCEDURE — 80048 BASIC METABOLIC PNL TOTAL CA: CPT

## 2018-01-28 PROCEDURE — 82306 VITAMIN D 25 HYDROXY: CPT

## 2018-01-28 PROCEDURE — 80069 RENAL FUNCTION PANEL: CPT

## 2018-01-29 VITALS
HEART RATE: 65 BPM | OXYGEN SATURATION: 94 % | TEMPERATURE: 97.8 F | RESPIRATION RATE: 18 BRPM | DIASTOLIC BLOOD PRESSURE: 76 MMHG | SYSTOLIC BLOOD PRESSURE: 185 MMHG

## 2018-01-29 VITALS
RESPIRATION RATE: 18 BRPM | DIASTOLIC BLOOD PRESSURE: 71 MMHG | OXYGEN SATURATION: 92 % | SYSTOLIC BLOOD PRESSURE: 155 MMHG | TEMPERATURE: 98.4 F | HEART RATE: 68 BPM

## 2018-01-29 VITALS
DIASTOLIC BLOOD PRESSURE: 79 MMHG | RESPIRATION RATE: 18 BRPM | HEART RATE: 66 BPM | OXYGEN SATURATION: 92 % | SYSTOLIC BLOOD PRESSURE: 173 MMHG | TEMPERATURE: 98.3 F

## 2018-01-29 VITALS
HEART RATE: 74 BPM | OXYGEN SATURATION: 95 % | RESPIRATION RATE: 18 BRPM | SYSTOLIC BLOOD PRESSURE: 154 MMHG | TEMPERATURE: 98.6 F | DIASTOLIC BLOOD PRESSURE: 74 MMHG

## 2018-01-29 VITALS — HEART RATE: 72 BPM

## 2018-01-29 VITALS
SYSTOLIC BLOOD PRESSURE: 179 MMHG | HEART RATE: 64 BPM | OXYGEN SATURATION: 95 % | RESPIRATION RATE: 18 BRPM | DIASTOLIC BLOOD PRESSURE: 74 MMHG | TEMPERATURE: 97.9 F

## 2018-01-29 VITALS
OXYGEN SATURATION: 86 % | TEMPERATURE: 97.7 F | DIASTOLIC BLOOD PRESSURE: 74 MMHG | HEART RATE: 60 BPM | RESPIRATION RATE: 18 BRPM | SYSTOLIC BLOOD PRESSURE: 166 MMHG

## 2018-01-29 VITALS — HEART RATE: 62 BPM

## 2018-01-29 VITALS
DIASTOLIC BLOOD PRESSURE: 68 MMHG | OXYGEN SATURATION: 94 % | HEART RATE: 61 BPM | RESPIRATION RATE: 17 BRPM | SYSTOLIC BLOOD PRESSURE: 157 MMHG

## 2018-01-29 VITALS — HEART RATE: 69 BPM

## 2018-01-29 VITALS — HEART RATE: 64 BPM

## 2018-01-29 LAB
ALBUMIN SERPL-MCNC: 2.9 GM/DL (ref 3.4–5)
ALP SERPL-CCNC: 69 U/L (ref 45–117)
ALT SERPL-CCNC: 13 U/L (ref 12–78)
AST SERPL-CCNC: 12 U/L (ref 15–37)
BASOPHILS # BLD AUTO: 0 TH/MM3 (ref 0–0.2)
BASOPHILS NFR BLD: 0.3 % (ref 0–2)
BILIRUB SERPL-MCNC: 0.3 MG/DL (ref 0.2–1)
BUN SERPL-MCNC: 6 MG/DL (ref 7–18)
BUN SERPL-MCNC: 7 MG/DL (ref 7–18)
CALCIUM SERPL-MCNC: 7.5 MG/DL (ref 8.5–10.1)
CALCIUM SERPL-MCNC: 7.7 MG/DL (ref 8.5–10.1)
CHLORIDE SERPL-SCNC: 101 MEQ/L (ref 98–107)
CHLORIDE SERPL-SCNC: 102 MEQ/L (ref 98–107)
CREAT SERPL-MCNC: 0.95 MG/DL (ref 0.6–1.3)
CREAT SERPL-MCNC: 0.96 MG/DL (ref 0.6–1.3)
EOSINOPHIL # BLD: 0.1 TH/MM3 (ref 0–0.4)
EOSINOPHIL NFR BLD: 0.8 % (ref 0–4)
ERYTHROCYTE [DISTWIDTH] IN BLOOD BY AUTOMATED COUNT: 14.8 % (ref 11.6–17.2)
GFR SERPLBLD BASED ON 1.73 SQ M-ARVRAT: 76 ML/MIN (ref 89–?)
GFR SERPLBLD BASED ON 1.73 SQ M-ARVRAT: 77 ML/MIN (ref 89–?)
GLUCOSE SERPL-MCNC: 125 MG/DL (ref 74–106)
GLUCOSE SERPL-MCNC: 134 MG/DL (ref 74–106)
HCO3 BLD-SCNC: 25.8 MEQ/L (ref 21–32)
HCO3 BLD-SCNC: 26.9 MEQ/L (ref 21–32)
HCT VFR BLD CALC: 32.3 % (ref 39–51)
HGB BLD-MCNC: 11 GM/DL (ref 13–17)
INR PPP: 1 RATIO
LYMPHOCYTES # BLD AUTO: 1.9 TH/MM3 (ref 1–4.8)
LYMPHOCYTES NFR BLD AUTO: 11 % (ref 9–44)
MAGNESIUM SERPL-MCNC: 2 MG/DL (ref 1.5–2.5)
MCH RBC QN AUTO: 29.2 PG (ref 27–34)
MCHC RBC AUTO-ENTMCNC: 34 % (ref 32–36)
MCV RBC AUTO: 85.9 FL (ref 80–100)
MONOCYTE #: 1.5 TH/MM3 (ref 0–0.9)
MONOCYTES NFR BLD: 8.6 % (ref 0–8)
NEUTROPHILS # BLD AUTO: 14.1 TH/MM3 (ref 1.8–7.7)
NEUTROPHILS NFR BLD AUTO: 79.3 % (ref 16–70)
PLATELET # BLD: 327 TH/MM3 (ref 150–450)
PMV BLD AUTO: 8.3 FL (ref 7–11)
PROT SERPL-MCNC: 6.9 GM/DL (ref 6.4–8.2)
PROTHROMBIN TIME: 9.8 SEC (ref 9.8–11.6)
RBC # BLD AUTO: 3.76 MIL/MM3 (ref 4.5–5.9)
SODIUM SERPL-SCNC: 134 MEQ/L (ref 136–145)
SODIUM SERPL-SCNC: 137 MEQ/L (ref 136–145)
WBC # BLD AUTO: 17.7 TH/MM3 (ref 4–11)

## 2018-01-29 RX ADMIN — MAGNESIUM HYDROXIDE PRN ML: 400 SUSPENSION ORAL at 21:43

## 2018-01-29 RX ADMIN — Medication SCH ML: at 08:37

## 2018-01-29 RX ADMIN — METOPROLOL TARTRATE SCH MG: 25 TABLET, FILM COATED ORAL at 08:38

## 2018-01-29 RX ADMIN — POTASSIUM CHLORIDE SCH MLS/HR: 10 INJECTION, SOLUTION INTRAVENOUS at 15:57

## 2018-01-29 RX ADMIN — POTASSIUM CHLORIDE SCH MLS/HR: 10 INJECTION, SOLUTION INTRAVENOUS at 14:05

## 2018-01-29 RX ADMIN — INSULIN ASPART SCH: 100 INJECTION, SOLUTION INTRAVENOUS; SUBCUTANEOUS at 12:00

## 2018-01-29 RX ADMIN — INSULIN ASPART SCH: 100 INJECTION, SOLUTION INTRAVENOUS; SUBCUTANEOUS at 21:00

## 2018-01-29 RX ADMIN — ATORVASTATIN CALCIUM SCH MG: 40 TABLET, FILM COATED ORAL at 21:42

## 2018-01-29 RX ADMIN — INSULIN ASPART SCH: 100 INJECTION, SOLUTION INTRAVENOUS; SUBCUTANEOUS at 17:00

## 2018-01-29 RX ADMIN — TAMSULOSIN HYDROCHLORIDE SCH MG: 0.4 CAPSULE ORAL at 21:42

## 2018-01-29 RX ADMIN — INSULIN ASPART SCH: 100 INJECTION, SOLUTION INTRAVENOUS; SUBCUTANEOUS at 08:00

## 2018-01-29 RX ADMIN — Medication SCH ML: at 21:43

## 2018-01-29 RX ADMIN — POTASSIUM CHLORIDE SCH MLS/HR: 10 INJECTION, SOLUTION INTRAVENOUS at 18:00

## 2018-01-29 RX ADMIN — METOPROLOL TARTRATE SCH MG: 25 TABLET, FILM COATED ORAL at 21:42

## 2018-01-29 RX ADMIN — POTASSIUM CHLORIDE SCH MLS/HR: 10 INJECTION, SOLUTION INTRAVENOUS at 15:00

## 2018-01-29 RX ADMIN — STANDARDIZED SENNA CONCENTRATE AND DOCUSATE SODIUM SCH TAB: 8.6; 5 TABLET, FILM COATED ORAL at 21:00

## 2018-01-29 RX ADMIN — POTASSIUM CHLORIDE SCH MLS/HR: 10 INJECTION, SOLUTION INTRAVENOUS at 16:53

## 2018-01-29 RX ADMIN — POTASSIUM CHLORIDE SCH MLS/HR: 10 INJECTION, SOLUTION INTRAVENOUS at 13:20

## 2018-01-29 RX ADMIN — STANDARDIZED SENNA CONCENTRATE AND DOCUSATE SODIUM SCH TAB: 8.6; 5 TABLET, FILM COATED ORAL at 08:38

## 2018-01-29 NOTE — HHI.HP
__________________________________________________





HPI


Service


[facility]


Primary Care Physician


Larry Cardoza M.D.


Admission Diagnosis





stenosis of the proximal iliac artery


Diagnoses:  


(1) Mesenteric ischemia


Diagnosis:  Principal





(2) Abdominal pain


Diagnosis:  Principal





(3) HTN (hypertension)


Diagnosis:  Principal





(4) DM (diabetes mellitus)


Diagnosis:  Principal





(5) Tobacco abuse


Diagnosis:  Principal





Travel History


International Travel<30 Days:  No


Contact w/Intl Traveler <30 Da:  No


Traveled to Known Affected Are:  No


History of Present Illness


This is a 77-year-old male with a PMH of HTN, COPD, DM, Prostate CA, Tobacco 

Abuse and Mesenteric Artery Stenosis who was transferred from [facility] for 

evaluation by Dr. Freeman for mesenteric ischemia.  Previous admit - for Sepsis w/ Gangrenous Cholecystitis, found to have mesenteric artery 

stenosis and underwent Lap [place]ya by Dr. Staton on 17.  Per patient he's 

had intermittent episodes of abdominal pain/cramping for a few weeks.  States 

pain is dull, intermittent, 8/10, associated w/ meals, non-radiating.  

Yesterday was having lunch and developed severe abdominal pain at which time he 

went to [facility].  CT Abd/Pelvis from  w/ 80% calcified stenosis of proximal 

celiac access and 50-60% calcified stenosis of proximal SMA.  Dr. Freeman 

consulted and accepted pt in transfer.





Review of Systems


Except as stated in HPI:  all other systems reviewed are Neg


ROS: 14 point review of systems otherwise negative.





Past Family Social History


Past Medical History


PMH:  HTN, COPD, DM, Prostate CA, Tobacco Abuse and Mesenteric Artery Stenosis


Past Surgical History


PAST SURGICAL HISTORY:  Cholecystectomy, Shoulder Surgery


Allergies:  


Coded Allergies:  


     No Known Drug Allergies (Verified  Allergy, Unknown, 18)


Family History


PAST FAMILY HISTORY:  Reviewed.  No h/o DM or CAD


Social History


PAST SOCIAL HISTORY:  Positive for tobacco.  Negative for alcohol or drugs.





Physical Exam


Vital Signs





Vital Signs








  Date Time  Temp Pulse Resp B/P (MAP) Pulse Ox O2 Delivery O2 Flow Rate FiO2


 


18 02:50 97.7 60 18 166/74 (104) 86   


 


18 02:11        


 


18 02:01  61 17 157/68 (97) 94 Nasal Cannula 2.00 


 


18 23:58   20     


 


18 23:49 97.8 62 20 168/67 (100) 92   








Physical Exam


PE:


GENERAL: Pleasant elderly white male in no acute distress.


HEENT: PERRLA, EOMI. No scleral icterus or conjunctival pallor. No lid lag or 

facial droop.  


CARDIOVASCULAR: Regular rate and rhythm.  No obvious murmurs to auscultation. 

No chest tenderness to palpation. 


RESPIRATORY: No obvious rhonchi or wheezing. Clear to auscultation. Breath 

sounds equal bilaterally. 


GASTROINTESTINAL: Abdomen soft, mild generalized tenderness to palpation, 

nondistended. BS normal. 


MUSCULOSKELETAL: Extremities without clubbing, cyanosis, or edema. No obvious 

deformities. 


NEUROLOGICAL: Awake, alert and oriented x4. No focal neurologic deficits. 

Moving both upper and lower extremities spontaneously.





Caprini VTE Risk Assessment


Caprini VTE Risk Assessment:  No/Low Risk (score <= 1)


Caprini Risk Assessment Model











 Point Value = 1          Point Value = 2  Point Value = 3  Point Value = 5


 


Age 41-60


Minor surgery


BMI > 25 kg/m2


Swollen legs


Varicose veins


Pregnancy or postpartum


History of unexplained or recurrent


   spontaneous 


Oral contraceptives or hormone


   replacement


Sepsis (< 1 month)


Serious lung disease, including


   pneumonia (< 1 month)


Abnormal pulmonary function


Acute myocardial infarction


Congestive heart failure (< 1 month)


History of inflammatory bowel disease


Medical patient at bed rest Age 61-74


Arthroscopic surgery


Major open surgery (> 45 min)


Laparoscopic surgery (> 45 min)


Malignancy


Confined to bed (> 72 hours)


Immobilizing plaster cast


Central venous access Age >= 75


History of VTE


Family history of VTE


Factor V Leiden


Prothrombin 16046W


Lupus anticoagulant


Anticardiolipin antibodies


Elevated serum homocysteine


Heparin-induced thrombocytopenia


Other congenital or acquired


   thrombophilia Stroke (< 1 month)


Elective arthroplasty


Hip, pelvis, or leg fracture


Acute spinal cord injury (< 1 month)








Prophylaxis Regimen











   Total Risk


Factor Score Risk Level Prophylaxis Regimen


 


0-1      Low Early ambulation


 


2 Moderate Order ONE of the following:


*Sequential Compression Device (SCD)


*Heparin 5000 units SQ BID


 


3-4 Higher Order ONE of the following medications:


*Heparin 5000 units SQ TID


*Enoxaparin/Lovenox 40 mg SQ daily (WT < 150 kg, CrCl > 30 mL/min)


*Enoxaparin/Lovenox 30 mg SQ daily (WT < 150 kg, CrCl > 10-29 mL/min)


*Enoxaparin/Lovenox 30 mg SQ BID (WT < 150 kg, CrCl > 30 mL/min)


AND/OR


*Sequential Compression Device (SCD)


 


5 or more Highest Order ONE of the following medications:


*Heparin 5000 units SQ TID (Preferred with Epidurals)


*Enoxaparin/Lovenox 40 mg SQ daily (WT < 150 kg, CrCl > 30 mL/min)


*Enoxaparin/Lovenox 30 mg SQ daily (WT < 150 kg, CrCl > 10-29 mL/min)


*Enoxaparin/Lovenox 30 mg SQ BID (WT < 150 kg, CrCl > 30 mL/min)


AND


*Sequential Compression Device (SCD)











Assessment and Plan


Problem List:  


(1) Mesenteric ischemia


ICD Code:  K55.9 - Vascular disorder of intestine, unspecified


(2) Abdominal pain


ICD Code:  R10.9 - Unspecified abdominal pain


(3) HTN (hypertension)


ICD Code:  I10 - Essential (primary) hypertension


(4) Tobacco abuse


ICD Code:  Z72.0 - Tobacco use


(5) DM (diabetes mellitus)


ICD Code:  E11.9 - Type 2 diabetes mellitus without complications


Assessment and Plan


A/P:


1.  Mesenteric Ischemia:  h/o mesenteric artery stenosis w/ progressive 

abdominal pain following meals, transferred from [facility] for eval w/ Dr. Freeman.  CT Abd/Pelvis from  w/ 80% calcified stenosis of proximal celiac 

axis and 50-60% calcified stenosis of proximal SMA, records reviewed by me.  

Consult for Dr. Freeman placed, will eval.  IVF for hydration, anticoagulation 

per vascular.  Resume Statin, Metoprolol.  NPO for likely intervention. 


2.  Abdominal Pain:  secondary to above, analgesics/antiemetics as needed. 


3.  HTN:  Resume home medications, monitor BP.


4.  DM:  Sliding scale w/ Accu-Cheks, hold Insulin as pt NPO 


5.  Tobacco Abuse:  Pt counselled.  Ativan prn.  No NicoDerm to avoid 

vasoconstriction. 


6.  DVT Prophylaxis: Anticoagulation post intervention.


7.  Social work for DC planning as needed.


8.  Labs/imaging/records FH and from previous admissions reviewed by me.





Physician Certification


2 Midnight Certification Type:  Admission for Inpatient Services


Order for Inpatient Services


The services are ordered in accordance with Medicare regulations or non-

Medicare payer requirements, as applicable.  In the case of services not 

specified as inpatient-only, they are appropriately provided as inpatient 

services in accordance with the 2-midnight benchmark.


Estimated LOS (days):  2


 days is the estimated time the patient will need to remain in the hospital, 

assuming treatment plan goals are met and no additional complications.


Post-Hospital Plan:  Not yet determined











Kathleen Garza MD 2018 03:43

## 2018-01-29 NOTE — PD.VS.CON
History of Present Illness


Chief Complaint:


Abdominal pain


Consult Requested by:


ED


History of Present Illness


78 yo male with known visceral artery occlusive disease.  He was adm in Nov and 

found to have cholecystitis and underwent lap modesto with resolution of 

abdominal pain.  Was in usual state of health until Fri (3d ago) when he began 

having post-prandial abdominal pain and that has been going on every time he 

eats.  He has longstanding diarrhea and constipation that pre-dates all the 

abdominal complaints.  He feels ok at present and is resting comfortably.





Past/Family/Social History


Past Medical History


HTN


XOL


GSV L shoulder


Prostate CA - getting injections


Past Surgical History


lap modesto in Nov


Social History


former smoker


Family History


NC


Home Medications


Active Scripts


Amlodipine (Norvasc) 10 Mg Tab, 10 MG PO DAILY for Blood Pressure Management 

for 30 Days, #30 TAB 0 Refills


   Prov:Della Valdez         11/27/17


[Calcium Carbonate Chew] 500 MG CHEW No Conflict Check, 500 MG CHEW Q8H for Low 

Calcium for 5 Days, #15


   Prov:Della Valdez         11/27/17


Atorvastatin (Atorvastatin) 40 Mg Tab, 40 MG PO HS for Cholesterol Management 

for 30 Days, #30 TAB


   Prov:Della Valdez         11/27/17


Metoprolol Tartrate (Metoprolol Tartrate) 25 Mg Tab, 12.5 MG PO BID for Blood 

Pressure Management for 30 Days, #30 TAB


   Prov:Della Valdez         11/27/17


Reported Medications


Loperamide (Loperamide) 2 Mg Cap, 2 MG PO AS DIRECTED Y for DIARRHEA, CAP 0 

Refills


   One capsule after each loose stool.


   Not to exceed 8 capsules per day.


   1/29/18


Prochlorperazine Maleate (Prochlorperazine Maleate) 10 Mg Tab, 10 MG PO Q6H Y 

for NAUSEA OR VOMITING, TAB 0 Refills


   1/29/18


Insulin Glargine (Basaglar Kwikpen) 100 Unit/Ml Pen, 6 UNITS SQ AC BREAKFAST 

for Blood Sugar Management, #5 PEN 0 Refills


   1/29/18


Insulin Glargine,Hum.rec.anlog (Basaglar Kwikpen U-100) 100 Unit/Ml (3 Ml) 

Insuln.pen, 10 SQ HS


   1/29/18


Oxycodone (Oxycodone) 10 Mg Tab, 10 MG PO Q8H Y for PAIN, TAB 0 Refills


   1/29/18


Degarelix Inj (Firmagon Inj) 80 Mg Inj, 80 MG SQ Q28D for Chemotherapy 

Management, #1 VIAL 0 Refills


   1/29/18


Diltiazem HCl Extended Release (Diltiazem HCl) 360 Mg Cap


   1/29/18


Glipizide (Glipizide) 5 Mg Tab, 5 MG PO BIDAC for Blood Sugar Management, #60 

TAB 0 Refills


   Take 30 minutes before a meal


   1/29/18


Lisinopril (Lisinopril) 20 Mg Tab, 20 MG PO DAILY, #30 TAB 0 Refills


   1/29/18


Tamsulosin (Tamsulosin) 0.4 Mg Cap, 0.4 MG PO HS for Manage Prostate Problems, #

30 CAP 0 Refills


   11/22/17


Discontinued Scripts


Potassium Phosphate-Sodium Phosphate (K-Phos Neutral) 155-852-130 Mg Tab, 500 

MG PO PCHS for Electrolyte Replacement for 3 Days, #12 TAB 0 Refills


   Prov:Della Valdez         11/27/17


Pantoprazole (Pantoprazole) 40 Mg Tab, 40 MG PO DAILY for GERD for 30 Days, #30 

TAB


   Prov:Della Valdez         11/27/17


Aspirin (Px Aspirin) 325 Mg Tab, 325 MG PO DAILY for Blood Clot Prevention for 

30 Days, #30 TAB


   Prov:Della Valdez         11/27/17


Hydrocodone/Acetaminophen (Hydrocodone-Acetamin 5-325 mg) 5 Mg-325 Mg Tablet, 1 

TAB PO Q4H Y for pain, #30 TAB


   Prov:Vira Baker         11/27/17


Metronidazole (Flagyl) 500 Mg Tab, 500 MG PO Q8HR for Infection for 7 Days, TAB


   Prov:Vira Baker         11/27/17


Levofloxacin (Levaquin) 750 Mg Tablet, 750 MG PO DAILY for Infection for 7 Days

, #7 TAB


   Prov:Vira Baker         11/27/17


Coded Allergies:  


     No Known Drug Allergies (Verified  Allergy, Unknown, 1/28/18)





Review of Systems


Constitutional:  DENIES: Fever, Weight loss


Cardiovascular:  DENIES: Chest pain


Gastrointestinal:  COMPLAINS OF: Abdominal pain, Constipation, Diarrhea





Physical Exam


Vitals/I&O











  Date Time  Temp Pulse Resp B/P (MAP) Pulse Ox O2 Delivery O2 Flow Rate FiO2


 


1/29/18 03:47  62      


 


1/29/18 02:50 97.7 60 18 166/74 (104) 86   


 


1/29/18 02:11        


 


1/29/18 02:01  61 17 157/68 (97) 94 Nasal Cannula 2.00 


 


1/28/18 23:58   20     


 


1/28/18 23:49 97.8 62 20 168/67 (100) 92   








Neuro:


alert, oriented, no distress


HEENT:


NC/AT; anicteric sclera


Neck:


no JVD; trachea midline


Heart:


reg rate, no M


Lungs:


clear B


Abdomen:


soft and no TTP, no rebound


healed lap modesto incisions


Vascular:


palpable UE pulses





Assessment and Plan


Plan


Abdominal pain that may be related to mesenteric ischemia, clearly no 

peritonitis at present.





1. Ok to have clear liquids


2. I will review CT from OSH


3. Likely mesenteric angiography and stent placement this admission








Gregor Freeman MD FACS RPVI





Associate Professor of Surgery


[facility] at [facility]





[phone]











Gregor Freeman MD Jan 29, 2018 05:54

## 2018-01-29 NOTE — PD
Physical Exam


Date Seen by Provider:  Jan 29, 2018


Time Seen by Provider:  12:20


Narrative


Patient seen in the ER as a courtesy to Dr. Freeman.  He apparently had 

abdominal pain, was seen in the ER for a [facility], and CAT scan showed 

that he had a iliac arterial stenosis, there is concern of underlying 

mesenteric ischemia, and patient was sent in to be evaluated by Dr. Freeman as a 

transfer.  I have discussed the case with Dr. Freeman who would like the patient 

to be medically admitted.  He did not have any further instructions at this 

time.  He states he will see the patient in a few hours.  Dr. Garza agrees to 

admit the patient.





GENERAL: Well-nourished, well-developed elderly white male patient in mild 

distress.


SKIN: Focused skin assessment warm/dry.


HEAD: Normocephalic.


EYES: No scleral icterus. No injection or drainage. 


NECK: Supple, trachea midline. No JVD or lymphadenopathy.


CARDIOVASCULAR: Regular rate and rhythm without murmurs, gallops, or rubs. 


RESPIRATORY: Breath sounds equal bilaterally. No accessory muscle use.


GASTROINTESTINAL: Abdomen soft, mild upper abdominal tenderness without 

guarding or rebound, nondistended. 


MUSCULOSKELETAL: No cyanosis, or edema. 


BACK: Nontender without obvious deformity. No CVA tenderness.





Data


Data


Last Documented VS





Vital Signs








  Date Time  Temp Pulse Resp B/P (MAP) Pulse Ox O2 Delivery O2 Flow Rate FiO2


 


1/28/18 23:58   20     


 


1/28/18 23:49 97.8 62  168/67 (100) 92   








Orders





 Orders


Admit Order (Ed Use Only) (1/29/18 00:08)








[facility]


Medical Record Reviewed:  Yes


Supervised Visit with LINDA:  No


Diagnosis





 Primary Impression:  


 Mesenteric artery stenosis





Admitting Information


Admitting Physician Requests:  Admit











Hiren Sykes MD Jan 29, 2018 00:28

## 2018-01-30 VITALS
SYSTOLIC BLOOD PRESSURE: 140 MMHG | DIASTOLIC BLOOD PRESSURE: 66 MMHG | HEART RATE: 69 BPM | OXYGEN SATURATION: 95 % | TEMPERATURE: 98 F | RESPIRATION RATE: 18 BRPM

## 2018-01-30 VITALS
RESPIRATION RATE: 17 BRPM | DIASTOLIC BLOOD PRESSURE: 66 MMHG | TEMPERATURE: 98 F | OXYGEN SATURATION: 95 % | SYSTOLIC BLOOD PRESSURE: 133 MMHG | HEART RATE: 72 BPM

## 2018-01-30 VITALS
TEMPERATURE: 98 F | HEART RATE: 64 BPM | OXYGEN SATURATION: 97 % | SYSTOLIC BLOOD PRESSURE: 161 MMHG | RESPIRATION RATE: 16 BRPM | DIASTOLIC BLOOD PRESSURE: 71 MMHG

## 2018-01-30 VITALS
OXYGEN SATURATION: 95 % | DIASTOLIC BLOOD PRESSURE: 64 MMHG | SYSTOLIC BLOOD PRESSURE: 142 MMHG | RESPIRATION RATE: 16 BRPM | HEART RATE: 64 BPM | TEMPERATURE: 98.5 F

## 2018-01-30 VITALS
SYSTOLIC BLOOD PRESSURE: 153 MMHG | DIASTOLIC BLOOD PRESSURE: 69 MMHG | RESPIRATION RATE: 18 BRPM | HEART RATE: 67 BPM | TEMPERATURE: 97.7 F | OXYGEN SATURATION: 95 %

## 2018-01-30 VITALS
DIASTOLIC BLOOD PRESSURE: 61 MMHG | OXYGEN SATURATION: 95 % | TEMPERATURE: 98.6 F | HEART RATE: 66 BPM | SYSTOLIC BLOOD PRESSURE: 135 MMHG | RESPIRATION RATE: 18 BRPM

## 2018-01-30 VITALS — HEART RATE: 71 BPM

## 2018-01-30 VITALS — HEART RATE: 66 BPM

## 2018-01-30 VITALS — HEART RATE: 74 BPM

## 2018-01-30 VITALS — HEART RATE: 62 BPM

## 2018-01-30 LAB
ALBUMIN SERPL-MCNC: 2.8 GM/DL (ref 3.4–5)
BASOPHILS # BLD AUTO: 0.1 TH/MM3 (ref 0–0.2)
BASOPHILS NFR BLD: 0.7 % (ref 0–2)
BUN SERPL-MCNC: 8 MG/DL (ref 7–18)
CALCIUM SERPL-MCNC: 7.8 MG/DL (ref 8.5–10.1)
CHLORIDE SERPL-SCNC: 105 MEQ/L (ref 98–107)
CREAT SERPL-MCNC: 1.01 MG/DL (ref 0.6–1.3)
EOSINOPHIL # BLD: 0.2 TH/MM3 (ref 0–0.4)
EOSINOPHIL NFR BLD: 1.9 % (ref 0–4)
ERYTHROCYTE [DISTWIDTH] IN BLOOD BY AUTOMATED COUNT: 15.2 % (ref 11.6–17.2)
GFR SERPLBLD BASED ON 1.73 SQ M-ARVRAT: 71 ML/MIN (ref 89–?)
GLUCOSE SERPL-MCNC: 107 MG/DL (ref 74–106)
HCO3 BLD-SCNC: 22.3 MEQ/L (ref 21–32)
HCT VFR BLD CALC: 33.7 % (ref 39–51)
HGB BLD-MCNC: 11.2 GM/DL (ref 13–17)
LYMPHOCYTES # BLD AUTO: 1.8 TH/MM3 (ref 1–4.8)
LYMPHOCYTES NFR BLD AUTO: 15.2 % (ref 9–44)
MAGNESIUM SERPL-MCNC: 2.4 MG/DL (ref 1.5–2.5)
MCH RBC QN AUTO: 29.2 PG (ref 27–34)
MCHC RBC AUTO-ENTMCNC: 33.2 % (ref 32–36)
MCV RBC AUTO: 88.1 FL (ref 80–100)
MONOCYTE #: 1.2 TH/MM3 (ref 0–0.9)
MONOCYTES NFR BLD: 9.9 % (ref 0–8)
NEUTROPHILS # BLD AUTO: 8.6 TH/MM3 (ref 1.8–7.7)
NEUTROPHILS NFR BLD AUTO: 72.3 % (ref 16–70)
PHOSPHATE SERPL-MCNC: 1.4 MG/DL (ref 2.5–4.9)
PLATELET # BLD: 330 TH/MM3 (ref 150–450)
PMV BLD AUTO: 8.4 FL (ref 7–11)
RBC # BLD AUTO: 3.82 MIL/MM3 (ref 4.5–5.9)
SODIUM SERPL-SCNC: 135 MEQ/L (ref 136–145)
WBC # BLD AUTO: 11.8 TH/MM3 (ref 4–11)

## 2018-01-30 RX ADMIN — INSULIN ASPART SCH: 100 INJECTION, SOLUTION INTRAVENOUS; SUBCUTANEOUS at 20:31

## 2018-01-30 RX ADMIN — MAGNESIUM HYDROXIDE PRN ML: 400 SUSPENSION ORAL at 11:52

## 2018-01-30 RX ADMIN — Medication SCH ML: at 08:01

## 2018-01-30 RX ADMIN — TAMSULOSIN HYDROCHLORIDE SCH MG: 0.4 CAPSULE ORAL at 20:29

## 2018-01-30 RX ADMIN — INSULIN ASPART SCH: 100 INJECTION, SOLUTION INTRAVENOUS; SUBCUTANEOUS at 08:46

## 2018-01-30 RX ADMIN — STANDARDIZED SENNA CONCENTRATE AND DOCUSATE SODIUM SCH TAB: 8.6; 5 TABLET, FILM COATED ORAL at 20:31

## 2018-01-30 RX ADMIN — ATORVASTATIN CALCIUM SCH MG: 40 TABLET, FILM COATED ORAL at 20:30

## 2018-01-30 RX ADMIN — INSULIN ASPART SCH: 100 INJECTION, SOLUTION INTRAVENOUS; SUBCUTANEOUS at 11:50

## 2018-01-30 RX ADMIN — INSULIN ASPART SCH: 100 INJECTION, SOLUTION INTRAVENOUS; SUBCUTANEOUS at 16:47

## 2018-01-30 RX ADMIN — STANDARDIZED SENNA CONCENTRATE AND DOCUSATE SODIUM SCH TAB: 8.6; 5 TABLET, FILM COATED ORAL at 08:01

## 2018-01-30 RX ADMIN — Medication SCH ML: at 20:31

## 2018-01-30 RX ADMIN — METOPROLOL TARTRATE SCH MG: 25 TABLET, FILM COATED ORAL at 20:30

## 2018-01-30 RX ADMIN — METOPROLOL TARTRATE SCH MG: 25 TABLET, FILM COATED ORAL at 08:01

## 2018-01-30 NOTE — PD.VS.PN
Subjective


Subjective/Hospital Course


Pt in bed alert in NAD


Pt c/o post-prandial abdominal pain worsening very time he eats


 (Jeanne Devi)





Objective


Vitals/I&O











  Date Time  Temp Pulse Resp B/P (MAP) Pulse Ox O2 Delivery O2 Flow Rate FiO2


 


1/30/18 08:43 98.0 72 17 133/66 (88) 95   


 


1/30/18 08:00      Room Air 2.00 


 


1/30/18 08:00  71      


 


1/30/18 04:01  66      


 


1/30/18 04:00 98.5 64 16 142/64 (90) 95   


 


1/30/18 00:00 98.6 66 18 135/61 (85) 95   


 


1/30/18 00:00  66      


 


1/29/18 20:00  71      


 


1/29/18 20:00      Room Air  


 


1/29/18 20:00 98.6 74 18 154/74 (100) 95   


 


1/29/18 16:03 98.4 68 18 155/71 (99) 92   


 


1/29/18 16:00  72      


 


1/29/18 12:29 97.9 64 18 179/74 (109) 95   


 


1/29/18 11:53  69      








Physical Exam


GENERAL: A&Ox3, NAD, GCS15


SKIN: Warm and dry.


GASTROINTESTINAL: Abdomen soft, non-tender, nondistended. + BS 


MUSCULOSKELETAL: No cyanosis, or edema. 








 (Jeanne Devi)


Laboratory





Laboratory Tests








Test


  1/29/18


16:44 1/30/18


07:40 1/30/18


08:40


 


Blood Urea Nitrogen 6  8  


 


Creatinine 0.96  1.01  


 


Random Glucose 125  107  


 


Calcium Level 7.5  7.8  


 


Magnesium Level 2.0  2.4  


 


Sodium Level 134  135  


 


Potassium Level 3.0  3.5  


 


Chloride Level 101  105  


 


Carbon Dioxide Level 25.8  22.3  


 


Anion Gap 7  8  


 


Estimat Glomerular Filtration


Rate 76 


  71 


  


 


 


Albumin  2.8  


 


Phosphorus Level  1.4  


 


White Blood Count   11.8 


 


Red Blood Count   3.82 


 


Hemoglobin   11.2 


 


Hematocrit   33.7 


 


Mean Corpuscular Volume   88.1 


 


Mean Corpuscular Hemoglobin   29.2 


 


Mean Corpuscular Hemoglobin


Concent 


  


  33.2 


 


 


Red Cell Distribution Width   15.2 


 


Platelet Count   330 


 


Mean Platelet Volume   8.4 


 


Neutrophils (%) (Auto)   72.3 


 


Lymphocytes (%) (Auto)   15.2 


 


Monocytes (%) (Auto)   9.9 


 


Eosinophils (%) (Auto)   1.9 


 


Basophils (%) (Auto)   0.7 


 


Neutrophils # (Auto)   8.6 


 


Lymphocytes # (Auto)   1.8 


 


Monocytes # (Auto)   1.2 


 


Eosinophils # (Auto)   0.2 


 


Basophils # (Auto)   0.1 


 


CBC Comment   DIFF FINAL 


 


Differential Comment    








 (Jeanne Devi)





Assessment and Plan


Assessment:  


(1) post prandial pain 


(2) Abdominal pain


Plan


Abdominal pain that may be related to mesenteric ischemia, clearly no 

peritonitis at present.


Pt continues w/ postprandial pain exacerbated after eating


Abdomen S/NT, + BS





Plan


Planning a mesenteric angiogram w/ possible stent placement w/  Dr. Freeman on 02 /01/18 (Thursday) 


Discussed and reviewed w/ pt 


Pt agrees w/ plan


Questions answered


Pt scheduled and consent signed





Jeanne Devi NP


[facility]


[phone]








 (Jeanne Devi)


Plan


Visceral artery occlusive disease.  


Potentially symptomatic,.


Based on outside CTA, appears to be amenable to endovascular therapy.


Plan for mesenteric angiogram on Thursday.


 (Gregor Freeman MD)











Jeanne Devi Jan 30, 2018 11:37


Gregor Freeman MD Jan 30, 2018 12:43

## 2018-01-30 NOTE — HHI.PR
Subjective


Remarks


He says he is feeling all right.  Denies any chest pain or shortness of breath.

  Requests advance diet.





Objective





Vital Signs








  Date Time  Temp Pulse Resp B/P (MAP) Pulse Ox O2 Delivery O2 Flow Rate FiO2


 


1/30/18 08:43 98.0 72 17 133/66 (88) 95   


 


1/30/18 08:00      Room Air 2.00 


 


1/30/18 08:00  71      


 


1/30/18 04:01  66      


 


1/30/18 04:00 98.5 64 16 142/64 (90) 95   


 


1/30/18 00:00 98.6 66 18 135/61 (85) 95   


 


1/30/18 00:00  66      


 


1/29/18 20:00  71      


 


1/29/18 20:00      Room Air  


 


1/29/18 20:00 98.6 74 18 154/74 (100) 95   


 


1/29/18 16:03 98.4 68 18 155/71 (99) 92   


 


1/29/18 16:00  72      


 


1/29/18 12:29 97.9 64 18 179/74 (109) 95   


 


1/29/18 11:53  69      














I/O      


 


 1/29/18 1/29/18 1/29/18 1/30/18 1/30/18 1/30/18





 07:00 15:00 23:00 07:00 15:00 23:00


 


Intake Total  400 ml 1260 ml   


 


Output Total   2000 ml   


 


Balance  400 ml -740 ml   


 


      


 


Intake Oral   960 ml   


 


IV Total  400 ml 300 ml   


 


Output Urine Total   2000 ml   


 


# Voids  2    


 


# Bowel Movements   0   








Result Diagram:  


1/30/18 0840                                                                   

             1/30/18 0740





Objective Remarks


GENERAL: lying in bed.  Appears comfortable.  Alert and oriented 3.


SKIN: Warm and dry.


HEAD: Normocephalic.


EYES: No scleral icterus. No injection or drainage. 


NECK: Supple, trachea midline. No JVD or lymphadenopathy.


CARDIOVASCULAR: Regular rate and rhythm without murmurs, gallops, or rubs. 


RESPIRATORY: Breath sounds equal bilaterally. No accessory muscle use.


GASTROINTESTINAL: Abdomen soft, non-tender, nondistended. 


MUSCULOSKELETAL: No cyanosis, or edema. 


BACK: Nontender without obvious deformity. No CVA tenderness.








A/P


Assessment and Plan


//Mesenteric Ischemia:  h/o mesenteric artery stenosis w/ progressive abdominal 

pain following meals, transferred from [facility] for eval w/ Dr. Freeman.  CT Abd

/Pelvis from  w/ 80% calcified stenosis of proximal celiac axis and 50-60% 

calcified stenosis of proximal SMA, records reviewed by me.  Consult for Dr. Freeman placed, will eval.  IVF for hydration, anticoagulation per vascular.  

Resume Statin, Metoprolol.  


= Plan for surgery on Thursday.  We'll advance diet to full liquid.





//Abdominal Pain:  secondary to above, analgesics/antiemetics as needed. 


= Approved.  Continue to monitor





//Hypophosphatemia.  Phosphorus 1.4.  Replaced.  Continue to monitor.





//HTN: Pressures acceptable. continue  Home medications, monitor BP.





// DM:  Sliding scale w/ Accu-Cheks, hold Insulin as pt NPO 





//Tobacco Abuse:  Pt counselled.  Ativan prn.  No NicoDerm to avoid 

vasoconstriction. 





//DVT Prophylaxis: Anticoagulation post intervention.


Discharge Planning


Plan for surgery on Thursday.  PT following.  Appreciate assistance.











Floyd Dorsey MD Jan 30, 2018 11:09

## 2018-01-31 VITALS
RESPIRATION RATE: 15 BRPM | HEART RATE: 60 BPM | OXYGEN SATURATION: 95 % | SYSTOLIC BLOOD PRESSURE: 146 MMHG | DIASTOLIC BLOOD PRESSURE: 66 MMHG | TEMPERATURE: 97.8 F

## 2018-01-31 VITALS
TEMPERATURE: 97.2 F | SYSTOLIC BLOOD PRESSURE: 133 MMHG | RESPIRATION RATE: 16 BRPM | HEART RATE: 71 BPM | OXYGEN SATURATION: 96 % | DIASTOLIC BLOOD PRESSURE: 88 MMHG

## 2018-01-31 VITALS
TEMPERATURE: 98 F | OXYGEN SATURATION: 96 % | HEART RATE: 67 BPM | DIASTOLIC BLOOD PRESSURE: 63 MMHG | RESPIRATION RATE: 18 BRPM | SYSTOLIC BLOOD PRESSURE: 135 MMHG

## 2018-01-31 VITALS
SYSTOLIC BLOOD PRESSURE: 142 MMHG | TEMPERATURE: 98.3 F | HEART RATE: 67 BPM | RESPIRATION RATE: 18 BRPM | DIASTOLIC BLOOD PRESSURE: 63 MMHG | OXYGEN SATURATION: 96 %

## 2018-01-31 VITALS
SYSTOLIC BLOOD PRESSURE: 142 MMHG | HEART RATE: 64 BPM | RESPIRATION RATE: 16 BRPM | TEMPERATURE: 98.3 F | OXYGEN SATURATION: 96 % | DIASTOLIC BLOOD PRESSURE: 60 MMHG

## 2018-01-31 VITALS — HEART RATE: 66 BPM

## 2018-01-31 VITALS
SYSTOLIC BLOOD PRESSURE: 163 MMHG | OXYGEN SATURATION: 95 % | TEMPERATURE: 97.9 F | HEART RATE: 68 BPM | RESPIRATION RATE: 18 BRPM | DIASTOLIC BLOOD PRESSURE: 76 MMHG

## 2018-01-31 VITALS — HEART RATE: 64 BPM

## 2018-01-31 LAB
ALBUMIN SERPL-MCNC: 2.9 GM/DL (ref 3.4–5)
BASOPHILS # BLD AUTO: 0.1 TH/MM3 (ref 0–0.2)
BASOPHILS NFR BLD: 0.9 % (ref 0–2)
BUN SERPL-MCNC: 11 MG/DL (ref 7–18)
CALCIUM SERPL-MCNC: 7.4 MG/DL (ref 8.5–10.1)
CHLORIDE SERPL-SCNC: 104 MEQ/L (ref 98–107)
CREAT SERPL-MCNC: 1.1 MG/DL (ref 0.6–1.3)
EOSINOPHIL # BLD: 0.3 TH/MM3 (ref 0–0.4)
EOSINOPHIL NFR BLD: 2.8 % (ref 0–4)
ERYTHROCYTE [DISTWIDTH] IN BLOOD BY AUTOMATED COUNT: 15.1 % (ref 11.6–17.2)
GFR SERPLBLD BASED ON 1.73 SQ M-ARVRAT: 65 ML/MIN (ref 89–?)
GLUCOSE SERPL-MCNC: 172 MG/DL (ref 74–106)
HCO3 BLD-SCNC: 23.5 MEQ/L (ref 21–32)
HCT VFR BLD CALC: 32.6 % (ref 39–51)
HGB BLD-MCNC: 11 GM/DL (ref 13–17)
LYMPHOCYTES # BLD AUTO: 2.4 TH/MM3 (ref 1–4.8)
LYMPHOCYTES NFR BLD AUTO: 23.9 % (ref 9–44)
MCH RBC QN AUTO: 29.2 PG (ref 27–34)
MCHC RBC AUTO-ENTMCNC: 33.6 % (ref 32–36)
MCV RBC AUTO: 87 FL (ref 80–100)
MONOCYTE #: 0.8 TH/MM3 (ref 0–0.9)
MONOCYTES NFR BLD: 8.5 % (ref 0–8)
NEUTROPHILS # BLD AUTO: 6.3 TH/MM3 (ref 1.8–7.7)
NEUTROPHILS NFR BLD AUTO: 63.9 % (ref 16–70)
PHOSPHATE SERPL-MCNC: 1.5 MG/DL (ref 2.5–4.9)
PLATELET # BLD: 358 TH/MM3 (ref 150–450)
PMV BLD AUTO: 7.9 FL (ref 7–11)
RBC # BLD AUTO: 3.75 MIL/MM3 (ref 4.5–5.9)
SODIUM SERPL-SCNC: 135 MEQ/L (ref 136–145)
WBC # BLD AUTO: 9.9 TH/MM3 (ref 4–11)

## 2018-01-31 RX ADMIN — METOPROLOL TARTRATE SCH MG: 25 TABLET, FILM COATED ORAL at 09:12

## 2018-01-31 RX ADMIN — Medication SCH ML: at 21:20

## 2018-01-31 RX ADMIN — INSULIN ASPART SCH: 100 INJECTION, SOLUTION INTRAVENOUS; SUBCUTANEOUS at 21:19

## 2018-01-31 RX ADMIN — ATORVASTATIN CALCIUM SCH MG: 40 TABLET, FILM COATED ORAL at 20:37

## 2018-01-31 RX ADMIN — TAMSULOSIN HYDROCHLORIDE SCH MG: 0.4 CAPSULE ORAL at 20:37

## 2018-01-31 RX ADMIN — Medication SCH ML: at 09:12

## 2018-01-31 RX ADMIN — STANDARDIZED SENNA CONCENTRATE AND DOCUSATE SODIUM SCH TAB: 8.6; 5 TABLET, FILM COATED ORAL at 09:00

## 2018-01-31 RX ADMIN — INSULIN ASPART SCH: 100 INJECTION, SOLUTION INTRAVENOUS; SUBCUTANEOUS at 17:54

## 2018-01-31 RX ADMIN — METOPROLOL TARTRATE SCH MG: 25 TABLET, FILM COATED ORAL at 20:37

## 2018-01-31 RX ADMIN — STANDARDIZED SENNA CONCENTRATE AND DOCUSATE SODIUM SCH TAB: 8.6; 5 TABLET, FILM COATED ORAL at 20:37

## 2018-01-31 RX ADMIN — INSULIN ASPART SCH: 100 INJECTION, SOLUTION INTRAVENOUS; SUBCUTANEOUS at 12:00

## 2018-01-31 RX ADMIN — INSULIN ASPART SCH: 100 INJECTION, SOLUTION INTRAVENOUS; SUBCUTANEOUS at 08:00

## 2018-01-31 NOTE — HHI.PR
Subjective


Remarks





Patient seen today around noon.  He denies any pain.  Denies any shortness 

breath.  Requesting increase in diet.  We discussed the need to avoid 

overtaxing his gut due to what appears to be intestinal angina.





Objective





Vital Signs








  Date Time  Temp Pulse Resp B/P (MAP) Pulse Ox O2 Delivery O2 Flow Rate FiO2


 


1/31/18 20:00 97.9 68 18 163/76 (105) 95   


 


1/31/18 16:15 98.3 64 16 142/60 (87) 96   


 


1/31/18 16:00  66      


 


1/31/18 12:00 98.3 67 18 142/63 (89) 96   


 


1/31/18 12:00  60      


 


1/31/18 08:00 98.0 67 18 135/63 (87) 96   


 


1/31/18 08:00  71      


 


1/31/18 07:00      Room Air  


 


1/31/18 04:10  64      


 


1/31/18 04:00 97.2 71 16 133/88 (103) 96   


 


1/31/18 00:00  60      


 


1/31/18 00:00 97.8 64 15 146/66 (92) 95   














I/O      


 


 1/30/18 1/30/18 1/30/18 1/31/18 1/31/18 1/31/18





 07:00 15:00 23:00 07:00 15:00 23:00


 


Intake Total   1115 ml 500 ml  960 ml


 


Output Total   400 ml   


 


Balance   715 ml 500 ml  960 ml


 


      


 


Intake Oral   960 ml 500 ml  960 ml


 


IV Total   155 ml   


 


Output Urine Total   400 ml   


 


# Voids    4  4


 


# Bowel Movements      1








Result Diagram:  


1/31/18 1340                                                                   

             1/31/18 1340





Objective Remarks


GENERAL: lying in bed.  Appears comfortable.  Alert and oriented 3.no change 

on exam


SKIN: Warm and dry.


HEAD: Normocephalic.


EYES: No scleral icterus. No injection or drainage. 


NECK: Supple, trachea midline. No JVD or lymphadenopathy.


CARDIOVASCULAR: Regular rate and rhythm without murmurs, gallops, or rubs. 


RESPIRATORY: Breath sounds equal bilaterally. No accessory muscle use.


GASTROINTESTINAL: Abdomen soft, non-tender, nondistended. 


MUSCULOSKELETAL: No cyanosis, or edema. 


BACK: Nontender without obvious deformity. No CVA tenderness.








A/P


Assessment and Plan


//Mesenteric Ischemia:  h/o mesenteric artery stenosis w/ progressive abdominal 

pain following meals, transferred from [facility] for eval w/ Dr. Freeman.  CT Abd

/Pelvis from  w/ 80% calcified stenosis of proximal celiac axis and 50-60% 

calcified stenosis of proximal SMA, records reviewed by me.  Consult for Dr. Freeman placed, will eval.  IVF for hydration, anticoagulation per vascular.  

Resume Statin, Metoprolol.  


= Plan for surgery on Thursday.  We'll advance diet to full liquid.


= 1/31.  Plan for surgery tomorrow.  Nothing by mouth at midnight.





//Abdominal Pain:  secondary to above, analgesics/antiemetics as needed. 


= Likely intestinal angina.  Improved on clears/full liquid diet..  Continue to 

monitor





//Hypophosphatemia.  Phosphorus 1.4.  Replaced.  Continue to monitor.


= 1/31.  Phosphorus 1.5.  Replaced again.  Recheck tomorrow.





//Hypokalemia.


= 1/31.  Potassium 3.3.  Replace and monitor.





//HTN: Blood Pressures acceptable. continue  Home medications, monitor BP.





// DM:  Sliding scale w/ Accu-Cheks, hold Insulin as pt NPO 





//Tobacco Abuse:  Pt counselled.  Ativan prn.  No NicoDerm to avoid 

vasoconstriction. 





//DVT Prophylaxis: Anticoagulation post intervention.


Discharge Planning


Plan for surgery on Thursday.  PT following.  Appreciate assistance.











Floyd Dorsey MD Jan 31, 2018 22:45

## 2018-01-31 NOTE — PD.VS.PN
Pre-operative Note


Pre-operative diagnosis:


mesenteric occlusive disease


Planned procedure:


Mesenteric angiogram, possible SMA and celiac stents


Interval History:


Pt admitted with post-prandial abdominal pain.  Stable.


Labs:





 Laboratory Results








Test


  1/29/18


08:38 1/30/18


07:40 1/30/18


08:40


 


Prothromb Time International


Ratio 1.0 RATIO 


  


  


 


 


Anion Gap  8 MEQ/L (5-15)  


 


Blood Urea Nitrogen  8 MG/DL (7-18)  


 


Creatinine


  


  1.01 MG/DL


(0.60-1.30) 


 


 


Random Glucose


  


  107 MG/DL


() 


 


 


Albumin


  


  2.8 GM/DL


(3.4-5.0) 


 


 


Calcium Level


  


  7.8 MG/DL


(8.5-10.1) 


 


 


Phosphorus Level


  


  1.4 MG/DL


(2.5-4.9) 


 


 


Magnesium Level


  


  2.4 MG/DL


(1.5-2.5) 


 


 


Sodium Level


  


  135 MEQ/L


(136-145) 


 


 


Potassium Level


  


  3.5 MEQ/L


(3.5-5.1) 


 


 


Chloride Level


  


  105 MEQ/L


() 


 


 


Carbon Dioxide Level


  


  22.3 MEQ/L


(21.0-32.0) 


 


 


Hematocrit


  


  


  33.7 %


(39.0-51.0)


 


Hemoglobin


  


  


  11.2 GM/DL


(13.0-17.0)


 


Mean Corpuscular Hemoglobin


  


  


  29.2 PG


(27.0-34.0)


 


Mean Corpuscular Hemoglobin


Concent 


  


  33.2 %


(32.0-36.0)


 


Mean Corpuscular Volume


  


  


  88.1 FL


(80.0-100.0)


 


Mean Platelet Volume


  


  


  8.4 FL


(7.0-11.0)


 


Platelet Count


  


  


  330 TH/MM3


(150-450)


 


Red Blood Count


  


  


  3.82 MIL/MM3


(4.50-5.90)


 


Red Cell Distribution Width


  


  


  15.2 %


(11.6-17.2)


 


White Blood Count


  


  


  11.8 TH/MM3


(4.0-11.0)








Blood:


none needed


Imaging:


CTA from OSH reviewed.  SMA and celiac with calcific stenosis.


Orders:


NPO after MN


Ancef 2g IV OCTOR


Post-operative destination:


PACU


Operative site marked:  No (mesenteric angiogram)


Consent:


Informed consent has been obtained from Silvestre Guillen. I have explained the 

procedure in detail and discussed the risks, benefits, and potential 

complications. All questions have been answered.











Gregor Freeman MD Jan 31, 2018 06:49

## 2018-02-01 VITALS — HEART RATE: 75 BPM

## 2018-02-01 VITALS
OXYGEN SATURATION: 95 % | SYSTOLIC BLOOD PRESSURE: 114 MMHG | RESPIRATION RATE: 18 BRPM | DIASTOLIC BLOOD PRESSURE: 57 MMHG | HEART RATE: 68 BPM | TEMPERATURE: 97.5 F

## 2018-02-01 VITALS
SYSTOLIC BLOOD PRESSURE: 133 MMHG | OXYGEN SATURATION: 97 % | RESPIRATION RATE: 20 BRPM | TEMPERATURE: 98.1 F | DIASTOLIC BLOOD PRESSURE: 65 MMHG | HEART RATE: 59 BPM

## 2018-02-01 VITALS
RESPIRATION RATE: 20 BRPM | SYSTOLIC BLOOD PRESSURE: 138 MMHG | DIASTOLIC BLOOD PRESSURE: 62 MMHG | OXYGEN SATURATION: 97 % | HEART RATE: 61 BPM | TEMPERATURE: 98.5 F

## 2018-02-01 VITALS
SYSTOLIC BLOOD PRESSURE: 114 MMHG | TEMPERATURE: 97.7 F | OXYGEN SATURATION: 95 % | DIASTOLIC BLOOD PRESSURE: 59 MMHG | RESPIRATION RATE: 18 BRPM | HEART RATE: 77 BPM

## 2018-02-01 VITALS
DIASTOLIC BLOOD PRESSURE: 59 MMHG | RESPIRATION RATE: 20 BRPM | SYSTOLIC BLOOD PRESSURE: 127 MMHG | OXYGEN SATURATION: 96 % | TEMPERATURE: 97.6 F | HEART RATE: 83 BPM

## 2018-02-01 VITALS — OXYGEN SATURATION: 98 %

## 2018-02-01 LAB
ALBUMIN SERPL-MCNC: 2.7 GM/DL (ref 3.4–5)
BASOPHILS # BLD AUTO: 0.1 TH/MM3 (ref 0–0.2)
BASOPHILS NFR BLD: 1.1 % (ref 0–2)
BUN SERPL-MCNC: 10 MG/DL (ref 7–18)
CALCIUM SERPL-MCNC: 7.4 MG/DL (ref 8.5–10.1)
CHLORIDE SERPL-SCNC: 108 MEQ/L (ref 98–107)
CREAT SERPL-MCNC: 1.13 MG/DL (ref 0.6–1.3)
EOSINOPHIL # BLD: 0.3 TH/MM3 (ref 0–0.4)
EOSINOPHIL NFR BLD: 3.4 % (ref 0–4)
ERYTHROCYTE [DISTWIDTH] IN BLOOD BY AUTOMATED COUNT: 15.4 % (ref 11.6–17.2)
GFR SERPLBLD BASED ON 1.73 SQ M-ARVRAT: 63 ML/MIN (ref 89–?)
GLUCOSE SERPL-MCNC: 191 MG/DL (ref 74–106)
HCO3 BLD-SCNC: 21.8 MEQ/L (ref 21–32)
HCT VFR BLD CALC: 33.4 % (ref 39–51)
HGB BLD-MCNC: 11 GM/DL (ref 13–17)
LYMPHOCYTES # BLD AUTO: 2.2 TH/MM3 (ref 1–4.8)
LYMPHOCYTES NFR BLD AUTO: 26.7 % (ref 9–44)
MAGNESIUM SERPL-MCNC: 2.4 MG/DL (ref 1.5–2.5)
MCH RBC QN AUTO: 29 PG (ref 27–34)
MCHC RBC AUTO-ENTMCNC: 32.9 % (ref 32–36)
MCV RBC AUTO: 88.1 FL (ref 80–100)
MONOCYTE #: 0.6 TH/MM3 (ref 0–0.9)
MONOCYTES NFR BLD: 7.7 % (ref 0–8)
NEUTROPHILS # BLD AUTO: 4.9 TH/MM3 (ref 1.8–7.7)
NEUTROPHILS NFR BLD AUTO: 61.1 % (ref 16–70)
PHOSPHATE SERPL-MCNC: 2.7 MG/DL (ref 2.5–4.9)
PLATELET # BLD: 372 TH/MM3 (ref 150–450)
PMV BLD AUTO: 7.9 FL (ref 7–11)
RBC # BLD AUTO: 3.79 MIL/MM3 (ref 4.5–5.9)
SODIUM SERPL-SCNC: 137 MEQ/L (ref 136–145)
WBC # BLD AUTO: 8.1 TH/MM3 (ref 4–11)

## 2018-02-01 PROCEDURE — 04713D1 DILATION OF CELIAC ARTERY WITH INTRALUMINAL DEVICE, USING DRUG-COATED BALLOON, PERCUTANEOUS APPROACH: ICD-10-PCS | Performed by: SURGERY

## 2018-02-01 PROCEDURE — 04753D1 DILATION OF SUPERIOR MESENTERIC ARTERY WITH INTRALUMINAL DEVICE, USING DRUG-COATED BALLOON, PERCUTANEOUS APPROACH: ICD-10-PCS | Performed by: SURGERY

## 2018-02-01 RX ADMIN — Medication SCH ML: at 21:54

## 2018-02-01 RX ADMIN — STANDARDIZED SENNA CONCENTRATE AND DOCUSATE SODIUM SCH TAB: 8.6; 5 TABLET, FILM COATED ORAL at 08:22

## 2018-02-01 RX ADMIN — INSULIN ASPART SCH: 100 INJECTION, SOLUTION INTRAVENOUS; SUBCUTANEOUS at 08:00

## 2018-02-01 RX ADMIN — INSULIN ASPART SCH: 100 INJECTION, SOLUTION INTRAVENOUS; SUBCUTANEOUS at 11:54

## 2018-02-01 RX ADMIN — Medication SCH ML: at 08:22

## 2018-02-01 RX ADMIN — INSULIN ASPART SCH: 100 INJECTION, SOLUTION INTRAVENOUS; SUBCUTANEOUS at 21:54

## 2018-02-01 RX ADMIN — INSULIN ASPART SCH: 100 INJECTION, SOLUTION INTRAVENOUS; SUBCUTANEOUS at 16:16

## 2018-02-01 RX ADMIN — STANDARDIZED SENNA CONCENTRATE AND DOCUSATE SODIUM SCH TAB: 8.6; 5 TABLET, FILM COATED ORAL at 21:54

## 2018-02-01 RX ADMIN — METOPROLOL TARTRATE SCH MG: 25 TABLET, FILM COATED ORAL at 21:54

## 2018-02-01 RX ADMIN — TAMSULOSIN HYDROCHLORIDE SCH MG: 0.4 CAPSULE ORAL at 21:54

## 2018-02-01 RX ADMIN — ATORVASTATIN CALCIUM SCH MG: 40 TABLET, FILM COATED ORAL at 21:54

## 2018-02-01 RX ADMIN — METOPROLOL TARTRATE SCH MG: 25 TABLET, FILM COATED ORAL at 07:25

## 2018-02-01 NOTE — HHI.PR
cc:   Gregor Freeman MD


__________________________________________________





Immediate Post Op Note


Procedure Date:


Feb 1, 2018


Pre Op Diagnosis:  


mesenteric ischemia


Post Op Diagnosis:  


mesenteric ischemia


Surgeon:


Gregor Freeman


Assistant(s):


none


Procedure:


1. Mesenteric angiogram


2. SMA stent with 6x 22 iCAST


3. celiac stent with 7 x 22 iCAST


4. L brachial artery exposure


Findings:


successful PTA/stent of SMA and celiac


Additional Information:


palpable radial pulse at end of case


Complications:


none


Specimen(s) removed:


none


Estimated blood loss:


50mL


Anesthesia:  General


Drains:  None


Fluids:  950mL


IVF


Patient to:  PACU


Patient Condition:  Good


Implant/Devices:  SEE IMPLANT LOG (if applicable)


Date/Time of Procedure:  SEE SURGICAL CARE RECORD











Gregor Freeman MD Feb 1, 2018 09:25

## 2018-02-01 NOTE — HHI.PR
Subjective


Remarks





Patient seen today around noon, following angiogram with stenting.  Would like 

to eat.





Objective





Vital Signs








  Date Time  Temp Pulse Resp B/P (MAP) Pulse Ox O2 Delivery O2 Flow Rate FiO2


 


2/1/18 16:00 97.7 77 18 114/59 (77) 95   


 


2/1/18 14:29       2.00 


 


2/1/18 12:31      Room Air  


 


2/1/18 12:00 97.5 68 18 114/57 (76) 95   


 


2/1/18 09:39 97.8 70 17 106/53 (70) 97 Nasal Cannula 2 


 


2/1/18 04:00      Room Air 2.00 


 


2/1/18 04:00  61      


 


2/1/18 04:00 98.5 66 20 138/62 (87) 97   


 


2/1/18 00:00  59      


 


2/1/18 00:00      Room Air 2.00 


 


2/1/18 00:00 98.1 81 20 133/65 (87) 97   


 


1/31/18 20:00      Room Air 2.00 


 


1/31/18 20:00 97.9 68 18 163/76 (105) 95   


 


1/31/18 20:00  69      














I/O      


 


 1/31/18 1/31/18 1/31/18 2/1/18 2/1/18 2/1/18





 07:00 15:00 23:00 07:00 15:00 23:00


 


Intake Total 500 ml  960 ml  950 ml 


 


Output Total     50 ml 


 


Balance 500 ml  960 ml  900 ml 


 


      


 


Intake Oral 500 ml  960 ml   


 


Other     950 ml 


 


Estimated Blood Loss     50 ml 


 


# Voids 4  4   4


 


# Bowel Movements   1   0








Result Diagram:  


2/1/18 1050                                                                    

            2/1/18 1050





Objective Remarks


GENERAL: sitting up in bed.  Appears comfortable.  Alert and oriented 3.


SKIN: Warm and dry.


HEAD: Normocephalic.


EYES: No scleral icterus. No injection or drainage. 


NECK: Supple, trachea midline. No JVD or lymphadenopathy.


CARDIOVASCULAR: Regular rate and rhythm without murmurs, gallops, or rubs. 


RESPIRATORY: Breath sounds equal bilaterally. No accessory muscle use.


GASTROINTESTINAL: Abdomen soft, non-tender, nondistended. 


MUSCULOSKELETAL: No cyanosis, or edema. 











A/P


Assessment and Plan


//Mesenteric Ischemia:  h/o mesenteric artery stenosis w/ progressive abdominal 

pain following meals, transferred from [facility] for eval w/ Dr. Freeman.  CT Abd

/Pelvis from  w/ 80% calcified stenosis of proximal celiac axis and 50-60% 

calcified stenosis of proximal SMA, records reviewed by me.  Consult for Dr. Freeman placed, will eval.  IVF for hydration, anticoagulation per vascular.  

Resume Statin, Metoprolol.  


= Plan for surgery on Thursday.  We'll advance diet to full liquid.


= 1/31.  Plan for surgery tomorrow.  Nothing by mouth at midnight.  2/1.  

Patient doing well status post angiogram,  stenting.  Advance diet a heart 

healthy, monitor improvement.





//Abdominal Pain:  secondary to above, analgesics/antiemetics as needed. 


= Likely intestinal angina.  Improved on clears/full liquid diet..  Continue to 

monitor


= Appears resolved.  Continue to monitor.





//Hypophosphatemia.  Phosphorus 1.4.  Replaced.  Continue to monitor.


= 1/31.  Phosphorus 1.5.  Replaced again.  Recheck tomorrow.





//Hypovitaminosis D


//Hypocalcemia.


-Calcium 7.4, however when corrected for albumin, is 8.3.


= Vitamin D 21.9.  Start replacement.  Will need recheck in 3 months.





//Hypokalemia.


= 1/31.  Potassium 3.3.  Replace and monitor.


= Resolved after replacement.  Recheck tomorrow due to risk of refeeding 

syndrome..





//HTN: Blood Pressures acceptable. continue  Home medications, monitor BP.





// DM:  Sliding scale w/ Accu-Cheks, hold Insulin as pt NPO 





//Tobacco Abuse:  Pt counselled.  Ativan prn.  No NicoDerm to avoid 

vasoconstriction. 





//DVT Prophylaxis: Anticoagulation post intervention.


Discharge Planning


If patient tolerating food, likely discharge home tomorrow if cleared by 

vascular surgery.











Floyd Dorsey MD Feb 1, 2018 17:45

## 2018-02-02 VITALS
RESPIRATION RATE: 20 BRPM | DIASTOLIC BLOOD PRESSURE: 60 MMHG | TEMPERATURE: 98.2 F | SYSTOLIC BLOOD PRESSURE: 133 MMHG | OXYGEN SATURATION: 97 % | HEART RATE: 70 BPM

## 2018-02-02 VITALS
DIASTOLIC BLOOD PRESSURE: 65 MMHG | OXYGEN SATURATION: 97 % | HEART RATE: 70 BPM | TEMPERATURE: 97.8 F | RESPIRATION RATE: 18 BRPM | SYSTOLIC BLOOD PRESSURE: 137 MMHG

## 2018-02-02 VITALS
DIASTOLIC BLOOD PRESSURE: 62 MMHG | TEMPERATURE: 98.2 F | HEART RATE: 69 BPM | RESPIRATION RATE: 18 BRPM | SYSTOLIC BLOOD PRESSURE: 124 MMHG | OXYGEN SATURATION: 95 %

## 2018-02-02 VITALS
RESPIRATION RATE: 20 BRPM | SYSTOLIC BLOOD PRESSURE: 114 MMHG | TEMPERATURE: 97.3 F | DIASTOLIC BLOOD PRESSURE: 58 MMHG | OXYGEN SATURATION: 96 % | HEART RATE: 74 BPM

## 2018-02-02 VITALS — HEART RATE: 66 BPM

## 2018-02-02 VITALS — HEART RATE: 70 BPM

## 2018-02-02 LAB
ALBUMIN SERPL-MCNC: 2.6 GM/DL (ref 3.4–5)
BASOPHILS # BLD AUTO: 0.1 TH/MM3 (ref 0–0.2)
BASOPHILS NFR BLD: 0.7 % (ref 0–2)
BUN SERPL-MCNC: 11 MG/DL (ref 7–18)
CALCIUM SERPL-MCNC: 7.8 MG/DL (ref 8.5–10.1)
CHLORIDE SERPL-SCNC: 109 MEQ/L (ref 98–107)
CREAT SERPL-MCNC: 1.32 MG/DL (ref 0.6–1.3)
EOSINOPHIL # BLD: 0.4 TH/MM3 (ref 0–0.4)
EOSINOPHIL NFR BLD: 3.8 % (ref 0–4)
ERYTHROCYTE [DISTWIDTH] IN BLOOD BY AUTOMATED COUNT: 15.1 % (ref 11.6–17.2)
GFR SERPLBLD BASED ON 1.73 SQ M-ARVRAT: 52 ML/MIN (ref 89–?)
GLUCOSE SERPL-MCNC: 131 MG/DL (ref 74–106)
HCO3 BLD-SCNC: 20.4 MEQ/L (ref 21–32)
HCT VFR BLD CALC: 33.1 % (ref 39–51)
HGB BLD-MCNC: 11.1 GM/DL (ref 13–17)
LYMPHOCYTES # BLD AUTO: 2.1 TH/MM3 (ref 1–4.8)
LYMPHOCYTES NFR BLD AUTO: 19.8 % (ref 9–44)
MAGNESIUM SERPL-MCNC: 2.5 MG/DL (ref 1.5–2.5)
MCH RBC QN AUTO: 29.4 PG (ref 27–34)
MCHC RBC AUTO-ENTMCNC: 33.7 % (ref 32–36)
MCV RBC AUTO: 87.3 FL (ref 80–100)
MONOCYTE #: 0.9 TH/MM3 (ref 0–0.9)
MONOCYTES NFR BLD: 8.6 % (ref 0–8)
NEUTROPHILS # BLD AUTO: 7 TH/MM3 (ref 1.8–7.7)
NEUTROPHILS NFR BLD AUTO: 67.1 % (ref 16–70)
PHOSPHATE SERPL-MCNC: 2.6 MG/DL (ref 2.5–4.9)
PLATELET # BLD: 363 TH/MM3 (ref 150–450)
PMV BLD AUTO: 7.5 FL (ref 7–11)
RBC # BLD AUTO: 3.79 MIL/MM3 (ref 4.5–5.9)
SODIUM SERPL-SCNC: 138 MEQ/L (ref 136–145)
WBC # BLD AUTO: 10.4 TH/MM3 (ref 4–11)

## 2018-02-02 RX ADMIN — STANDARDIZED SENNA CONCENTRATE AND DOCUSATE SODIUM SCH TAB: 8.6; 5 TABLET, FILM COATED ORAL at 09:06

## 2018-02-02 RX ADMIN — INSULIN ASPART SCH: 100 INJECTION, SOLUTION INTRAVENOUS; SUBCUTANEOUS at 12:00

## 2018-02-02 RX ADMIN — Medication SCH ML: at 09:08

## 2018-02-02 RX ADMIN — INSULIN ASPART SCH: 100 INJECTION, SOLUTION INTRAVENOUS; SUBCUTANEOUS at 07:50

## 2018-02-02 RX ADMIN — METOPROLOL TARTRATE SCH MG: 25 TABLET, FILM COATED ORAL at 09:05

## 2018-02-02 NOTE — MP
cc:

GREGOR FREEMAN MD

****

 

 

DATE OF SURGERY

2/1/2018

 

PREOPERATIVE DIAGNOSIS

Mesenteric ischemia, visceral artery occlusive disease

 

POSTOPERATIVE DIAGNOSIS

Mesenteric ischemia, visceral artery occlusive disease

 

PROCEDURE

1. Left brachial artery exposure.

2. Mesenteric angiogram with selective catheterization of SMA and celiac

   arteries.

3. SMA stent with a 6 x 22 iCAST.

4. Celiac stent with a 7 x 22 iCAST.

 

ATTENDING SURGEON

Gregor Freeman.

 

ANESTHESIA

General.

 

INDICATION

Mr. Guillen is a 77-year-old gentleman with visceral artery occlusive disease and

recurrent abdominal pain that is now postprandial.  He is taken to the

operating room for endovascular evaluation and treatment.

 

DESCRIPTION OF PROCEDURE

Informed consent was obtained from the patient.  He was taken to the operating

room and placed supine on the operating table.  An appropriate timeout was

taken to ensure the patient's identity, operative site and planned procedure.

Two grams of Ancef was initiated prior to skin incision and will be

discontinued after a single preoperative dose.  Everyone in the room agreed

with the timeout and we proceeded.

 

His left arm was prepped and draped as well as groins.  An incision was made

over the antecubitum and carried down to subcutaneous tissue with

electrocautery.  The brachial artery was identified and dissected free and

encircled with a vessel loop.  The patient was systemically heparinized with

3000 units of IV heparin.  A 21-gauge micropuncture needle was used to access

the brachial artery.  This was exchanged using Seldinger technique for a

micropuncture sheath through which a 0.035 Glidewire was introduced.  The

micropuncture sheath was changed for a 5 Malagasy sheath.  The Glidewire was

navigated to the aorta and a pigtail catheter was placed over the wire into the

sheath.  The pigtail catheter and Glidewire were used to navigate down to the

paravisceral aorta and an aortogram was obtained.

 

3000 additional units of intravenous heparin were administered.  A Braswell wire

was introduced through the pigtail and the pigtail and 5 Malagasy sheath were

removed.  A 7 Malagasy, 70 cm sheath was introduced and an MPA catheter was

placed over the Braswell was exchanged for a Glidewire.  Using the glide and MPA

we were able to navigate into the SMA and this was confirmed angiographically.

The glide was exchanged for a Braswell, the MPA catheter was removed, and the SMA

was treated with a 6 x 22 high iCAST that had an excellent result without any

recoil extravasation and was well-seated.  The wire and catheter were pulled

back into the aorta and the glide an MPA used to navigate into the celiac

artery.  Once we were confirmed to be in the celiac artery this was predilated

with a 5 mm balloon and then a 7 x 22 iCAST was placed over the Braswell wire into

the orifice of the celiac artery.  The completion angiogram showed excellent

result without any recoil extravasation.

 

The wire, catheter and sheath were removed.  The brachial artery was controlled

with profunda clamps and the arteriotomy was closed with interrupted 6-0

Prolene sutures.  There was a nice pulse at the wrist.  The wound was

infiltrated with Marcaine and closed with 2-0 Polysorb, 3-0 Polysorb and 4-0

Monocryl.  Sponge and needle counts were correct at the end of the case.  I was

present and scrubbed and performed the entire procedure.

 

 

                              _________________________________

                              MD DELMY Ernst/RUSTY

D:  2/1/2018/11:08 AM

T:  2/2/2018/11:30 AM

Visit #:  [id number]

Job #:  [id number]

## 2018-02-02 NOTE — HHI.PR
Subjective


Remarks


He says he is feeling well.  Like to go home.  A large meal last night with no 

issues





Objective





Vital Signs








  Date Time  Temp Pulse Resp B/P (MAP) Pulse Ox O2 Delivery O2 Flow Rate FiO2


 


2/2/18 09:31      Room Air  


 


2/2/18 08:44 98.2 69 18 124/62 (82) 95   


 


2/2/18 08:00  66      


 


2/2/18 04:02      Room Air  


 


2/2/18 04:02 98.2 70 20 133/60 (84) 97   


 


2/2/18 03:44  70      


 


2/2/18 00:36      Room Air  


 


2/2/18 00:36 97.3 74 20 114/58 (76) 96   


 


2/1/18 23:49  75      


 


2/1/18 21:46     98   


 


2/1/18 20:58 97.6 83 20 127/59 (81) 96   


 


2/1/18 20:58      Room Air  


 


2/1/18 19:45  75      


 


2/1/18 16:00 97.7 77 18 114/59 (77) 95   


 


2/1/18 16:00  67      


 


2/1/18 14:29       2.00 


 


2/1/18 12:31      Room Air  


 


2/1/18 12:00 97.5 68 18 114/57 (76) 95   














I/O      


 


 2/1/18 2/1/18 2/1/18 2/2/18 2/2/18 2/2/18





 07:00 15:00 23:00 07:00 15:00 23:00


 


Intake Total  950 ml  710 ml  


 


Output Total  50 ml    


 


Balance  900 ml  710 ml  


 


      


 


Intake Oral    710 ml  


 


Other  950 ml    


 


Estimated Blood Loss  50 ml    


 


# Voids   4 4  


 


# Bowel Movements   0 0  








Result Diagram:  


2/2/18 0554                                                                    

            2/2/18 0559





Objective Remarks


GENERAL: sitting up in bed.  Appears comfortable.  Alert and oriented 3.


SKIN: Warm and dry.


HEAD: Normocephalic.


EYES: No scleral icterus. No injection or drainage. 


NECK: Supple, trachea midline. No JVD.


CARDIOVASCULAR: Regular rate and rhythm without murmurs, gallops, or rubs. 


RESPIRATORY: Breath sounds equal bilaterally. No accessory muscle use.


GASTROINTESTINAL: Abdomen soft, non-tender, nondistended. 


MUSCULOSKELETAL: No cyanosis, or edema. 











A/P


Assessment and Plan


//Mesenteric Ischemia:  h/o mesenteric artery stenosis w/ progressive abdominal 

pain following meals, transferred from [facility] for eval w/ Dr. Freeman.  CT Abd

/Pelvis from  w/ 80% calcified stenosis of proximal celiac axis and 50-60% 

calcified stenosis of proximal SMA, records reviewed by me.  Consult for Dr. Freeman placed, will eval.  IVF for hydration, anticoagulation per vascular.  

Resume Statin, Metoprolol.  


= Plan for surgery on Thursday.  We'll advance diet to full liquid.


= 1/31.  Plan for surgery tomorrow.  Nothing by mouth at midnight.  2/1.  

Patient doing well status post angiogram,  stenting.  Advance diet a heart 

healthy, monitor improvement.


= 2/2.  Patient doing well.  I'll regards to her without any issues.  Discussed 

with vascular surgery.  Patient cleared to go home.





//Acute kidney injury.  Creatinine 1.3.  Likely secondary to being nothing by 

mouth yesterday.  He did get 70 cc of contrast during the angiogram yesterday.  

We'll give him small bolus today, repeat labs as outpatient to be copy to PCP.





//Abdominal Pain:  secondary to above, analgesics/antiemetics as needed. 


= Likely intestinal angina.  Improved on clears/full liquid diet..  Continue to 

monitor


= Appears resolved.  Continue to monitor.





//Hypophosphatemia.  Phosphorus 1.4.  Replaced.  Continue to monitor.


= 1/31.  Phosphorus 1.5.  Replaced again.  Recheck tomorrow.


= Resolved after replacement.





//Hypovitaminosis D


//Hypocalcemia.


-Calcium 7.4, however when corrected for albumin, is 8.3.


= Vitamin D 21.9.  Start replacement.  Will need recheck in 3 months.


= Sent home on replacement.





//Hypokalemia.


= 1/31.  Potassium 3.3.  Replace and monitor.


= Resolved after replacement.  Recheck tomorrow due to risk of refeeding 

syndrome..





//HTN: Blood Pressures acceptable. continue  Home medications, monitor BP.





// DM:  Sliding scale w/ Accu-Cheks, hold Insulin as pt NPO 





//Tobacco Abuse:  Pt counselled.  Ativan prn.  No NicoDerm to avoid 

vasoconstriction. 





//DVT Prophylaxis: Anticoagulation post intervention.


Discharge Planning


discharge home today.  Follow with primary care.  Repeat labs as outpatient 

copy to primary care.











Floyd Dorsey MD Feb 2, 2018 11:57

## 2018-02-02 NOTE — HHI.DS
__________________________________________________





Discharge Summary


Admission Date


Jan 29, 2018 at 00:11


Discharge Date:  Feb 2, 2018


Admitting Diagnosis





stenosis of the proximal iliac artery





(1) Mesenteric ischemia


ICD Code:  K55.9 - Vascular disorder of intestine, unspecified


(2) Abdominal pain


ICD Code:  R10.9 - Unspecified abdominal pain


(3) HTN (hypertension)


ICD Code:  I10 - Essential (primary) hypertension


(4) Tobacco abuse


ICD Code:  Z72.0 - Tobacco use


(5) DM (diabetes mellitus)


ICD Code:  E11.9 - Type 2 diabetes mellitus without complications


Procedures


Endovascular stenting of mesenteric arteries.  Please see report.


Brief History - From Admission


This is a 77-year-old male with a PMH of HTN, COPD, DM, Prostate CA, Tobacco 

Abuse and Mesenteric Artery Stenosis who was transferred from [facility] for 

evaluation by Dr. Freeman for mesenteric ischemia.  Previous admit 11/22-11/28/ 17 for Sepsis w/ Gangrenous Cholecystitis, found to have mesenteric artery 

stenosis and underwent Lap Deya by Dr. Staton on 11/24/17.  Per patient he's 

had intermittent episodes of abdominal pain/cramping for a few weeks.  States 

pain is dull, intermittent, 8/10, associated w/ meals, non-radiating.  

Yesterday was having lunch and developed severe abdominal pain at which time he 

went to [facility].  CT Abd/Pelvis from  w/ 80% calcified stenosis of proximal 

celiac access and 50-60% calcified stenosis of proximal SMA.  Dr. Freeman 

consulted and accepted pt in transfer.


CBC/BMP:  


2/2/18 0554                                                                    

            2/2/18 0559





Significant Findings





Laboratory Tests








Test


  1/31/18


13:40 2/1/18


10:50 2/2/18


05:54 2/2/18


05:59


 


Red Blood Count


  3.75 MIL/MM3


(4.50-5.90) 3.79 MIL/MM3


(4.50-5.90) 3.79 MIL/MM3


(4.50-5.90) 


 


 


Hemoglobin


  11.0 GM/DL


(13.0-17.0) 11.0 GM/DL


(13.0-17.0) 11.1 GM/DL


(13.0-17.0) 


 


 


Hematocrit


  32.6 %


(39.0-51.0) 33.4 %


(39.0-51.0) 33.1 %


(39.0-51.0) 


 


 


Monocytes (%) (Auto)


  8.5 %


(0.0-8.0) 


  8.6 %


(0.0-8.0) 


 


 


Random Glucose


  172 MG/DL


() 191 MG/DL


() 


  131 MG/DL


()


 


Albumin


  2.9 GM/DL


(3.4-5.0) 2.7 GM/DL


(3.4-5.0) 


  2.6 GM/DL


(3.4-5.0)


 


Calcium Level


  7.4 MG/DL


(8.5-10.1) 7.4 MG/DL


(8.5-10.1) 


  7.8 MG/DL


(8.5-10.1)


 


Phosphorus Level


  1.5 MG/DL


(2.5-4.9) 


  


  


 


 


Sodium Level


  135 MEQ/L


(136-145) 


  


  


 


 


Potassium Level


  3.3 MEQ/L


(3.5-5.1) 


  


  


 


 


Estimat Glomerular Filtration


Rate 65 ML/MIN


(>89) 63 ML/MIN


(>89) 


  52 ML/MIN


(>89)


 


Chloride Level


  


  108 MEQ/L


() 


  109 MEQ/L


()


 


25-Hydroxy Vitamin D Total


  


  21.9 ng/ML


() 


  


 


 


Creatinine


  


  


  


  1.32 MG/DL


(0.60-1.30)


 


Carbon Dioxide Level


  


  


  


  20.4 MEQ/L


(21.0-32.0)








Hospital Course





Patient was monitored closely on the medical floor.  Initially surgery was 

consulted, patient underwent endovascular stenting of mesenteric arteries.  

Please see report.  Patient will be started on aspirin, Plavix.  Creatinine did 

increase from 1.0-1.3 on day of discharge.  Small fluid bolus was given.  

Patient's ACE inhibitor will be discontinued for now, will be started on low 

dose of hydralazine.  Patient will obtain repeat labs as outpatient to be 

forwarded to primary care.  Patient tolerated food following procedure.  Will 

be discharged home.  Follow-up with vascular surgery in 2 weeks.





For problem-based summary from most recent progress note, please see below.








//Mesenteric Ischemia:  h/o mesenteric artery stenosis w/ progressive abdominal 

pain following meals, transferred from [facility] for eval w/ Dr. Freeman.  CT Abd

/Pelvis from  w/ 80% calcified stenosis of proximal celiac axis and 50-60% 

calcified stenosis of proximal SMA, records reviewed by me.  Consult for Dr. Freeman placed, will eval.  IVF for hydration, anticoagulation per vascular.  

Resume Statin, Metoprolol.  


= Plan for surgery on Thursday.  We'll advance diet to full liquid.


= 1/31.  Plan for surgery tomorrow.  Nothing by mouth at midnight.  2/1.  

Patient doing well status post angiogram,  stenting.  Advance diet a heart 

healthy, monitor improvement.


= 2/2.  Patient doing well.  I'll regards to her without any issues.  Discussed 

with vascular surgery.  Patient cleared to go home.





//Acute kidney injury.  Creatinine 1.3.  Likely secondary to being nothing by 

mouth yesterday.  He did get 70 cc of contrast during the angiogram yesterday.  

We'll give him small bolus today, repeat labs as outpatient to be copy to PCP.





//Abdominal Pain:  secondary to above, analgesics/antiemetics as needed. 


= Likely intestinal angina.  Improved on clears/full liquid diet..  Continue to 

monitor


= Appears resolved.  Continue to monitor.





//Hypophosphatemia.  Phosphorus 1.4.  Replaced.  Continue to monitor.


= 1/31.  Phosphorus 1.5.  Replaced again.  Recheck tomorrow.


= Resolved after replacement.





//Hypovitaminosis D


//Hypocalcemia.


-Calcium 7.4, however when corrected for albumin, is 8.3.


= Vitamin D 21.9.  Start replacement.  Will need recheck in 3 months.


= Sent home on replacement.





//Hypokalemia.


= 1/31.  Potassium 3.3.  Replace and monitor.


= Resolved after replacement.  Recheck tomorrow due to risk of refeeding 

syndrome..





//HTN: Blood Pressures acceptable. continue  Home medications, monitor BP.





// DM:  Sliding scale w/ Accu-Cheks, hold Insulin as pt NPO 





//Tobacco Abuse:  Pt counselled.  Ativan prn.  No NicoDerm to avoid 

vasoconstriction. 





//DVT Prophylaxis: Anticoagulation post intervention.


Discharge Planning


discharge home today.  Follow with primary care.  Repeat labs as outpatient 

copy to primary care.


Pt Condition on Discharge:  Good


Discharge Disposition:  Discharge Home


Discharge Time:  > 30 minutes


Discharge Instructions


DIET: Follow Instructions for:  Diabetic Diet


Activities you can perform:  Regular-No Restrictions


Follow up Referrals:  


PCP Follow-up - 3-5 Days with Larry Cardoza M.d.


Vascular Surgery @  Vascular Surgery with Gregor Freeman MD





New Orders:  


RENAL FUNCTION PANEL - 3-5 Days





New Medications:  


Aspirin DR (Aspirin EC) 81 Mg Tabdr


81 MG PO DAILY for Blood Clot Prevention for 30 Days, #30 TAB 0 Refills





Cholecalciferol (Vitamin D3) 2,000 Unit Cap


2000 UNITS PO DAILY for Nutritional Supplement, #1 BOTTLE 0 Refills





Hydralazine HCl (Hydralazine HCl) 25 Mg Tablet


25 MG PO TID for Blood Pressure Management, #90 TAB 0 Refills





Clopidogrel (Plavix) 75 Mg Tab


75 MG PO DAILY for Blood Clot Prevention for 30 Days, #30 TAB





 


Continued Medications:  


Amlodipine (Norvasc) 10 Mg Tab


10 MG PO DAILY for Blood Pressure Management for 30 Days, #30 TAB 0 Refills





Atorvastatin (Atorvastatin) 40 Mg Tab


40 MG PO HS for Cholesterol Management for 30 Days, #30 TAB





Degarelix Inj (Firmagon Inj) 80 Mg Inj


80 MG SQ Q28D for Chemotherapy Management, #1 VIAL 0 Refills





Glipizide (Glipizide) 5 Mg Tab


5 MG PO BIDAC for Blood Sugar Management, #60 TAB 0 Refills


Take 30 minutes before a meal


Insulin Glargine (Basaglar Kwikpen) 100 Unit/Ml Pen


6 UNITS SQ AC BREAKFAST for Blood Sugar Management, #5 PEN 0 Refills





Insulin Glargine,Hum.rec.anlog (Basaglar Kwikpen U-100) 100 Unit/Ml (3 Ml) 

Insuln.pen


10 SQ HS





Loperamide (Loperamide) 2 Mg Cap


2 MG PO AS DIRECTED PRN for DIARRHEA, CAP 0 Refills


One capsule after each loose stool.


 Not to exceed 8 capsules per day.


Metoprolol Tartrate (Metoprolol Tartrate) 25 Mg Tab


12.5 MG PO BID for Blood Pressure Management for 30 Days, #30 TAB





Oxycodone (Oxycodone) 10 Mg Tab


10 MG PO Q8H PRN for PAIN, TAB 0 Refills





Prochlorperazine Maleate (Prochlorperazine Maleate) 10 Mg Tab


10 MG PO Q6H PRN for NAUSEA OR VOMITING, TAB 0 Refills





Tamsulosin (Tamsulosin) 0.4 Mg Cap


0.4 MG PO HS for Manage Prostate Problems, #30 CAP 0 Refills





[Calcium Carbonate Chew] () 500 MG CHEW


500 MG CHEW Q8H for Low Calcium for 5 Days, #15





 


Discontinued Medications:  


Diltiazem HCl Extended Release (Diltiazem HCl) 360 Mg Cap








Lisinopril (Lisinopril) 20 Mg Tab


20 MG PO DAILY, #30 TAB 0 Refills

















Floyd Dorsey MD Feb 2, 2018 12:01